# Patient Record
Sex: FEMALE | Race: WHITE | Employment: OTHER | RURAL
[De-identification: names, ages, dates, MRNs, and addresses within clinical notes are randomized per-mention and may not be internally consistent; named-entity substitution may affect disease eponyms.]

---

## 2017-01-12 RX ORDER — AMLODIPINE BESYLATE 5 MG/1
5 TABLET ORAL DAILY
Qty: 90 TAB | Refills: 3 | Status: SHIPPED | OUTPATIENT
Start: 2017-01-12 | End: 2018-01-16 | Stop reason: SDUPTHER

## 2017-01-23 ENCOUNTER — TELEPHONE (OUTPATIENT)
Dept: FAMILY MEDICINE CLINIC | Age: 72
End: 2017-01-23

## 2017-01-23 NOTE — TELEPHONE ENCOUNTER
PT contacted us, having a recurrence of the same kind of rash that she had when she was on pravastatin, started about 2 wk ago. She reported she stopped the lovastatin when the rash started, but the rash didn't improve this time like it did when she was on pravastatin. May be having SE's to norvasc. Try d/c norvasc, and stay off of lovastatin for now, see how BP's do. Has periactin to take PRN itching. Pt will let us know how things are going.

## 2017-01-23 NOTE — TELEPHONE ENCOUNTER
[phone] contact number per patient, please call back as she spoke with Dr. Joshua Franco around 1pm and need him to repeat the conversation and have to ask another question concerning medication that patient and doctor just discussed. The medication lovastatin (MEVACOR) 10 mg tablets? ?  Please call as patient has not taken any today and needs to know if she should continue as she has NOT taken this medication for 2 weeks. Contact number once again is [phone].   Thanks,

## 2017-01-27 ENCOUNTER — TELEPHONE (OUTPATIENT)
Dept: FAMILY MEDICINE CLINIC | Age: 72
End: 2017-01-27

## 2017-01-27 NOTE — TELEPHONE ENCOUNTER
[phone] contact number, please call as she went to Dermatolgist on yesterday(01/26/2017) in [facility], Dr. Rufina Anderson and please call so she can give you the information. Patient states that you instructed her to call you today(Friday) and she will be waiting your call. Please call @ [phone].   Thanks,

## 2017-02-07 ENCOUNTER — TELEPHONE (OUTPATIENT)
Dept: FAMILY MEDICINE CLINIC | Age: 72
End: 2017-02-07

## 2017-02-07 DIAGNOSIS — L30.8 OTHER ECZEMA: Primary | ICD-10-CM

## 2017-02-07 DIAGNOSIS — E78.00 PURE HYPERCHOLESTEROLEMIA: ICD-10-CM

## 2017-02-07 NOTE — TELEPHONE ENCOUNTER
Abigail called. They found out she had eczema. Dr. Nadja Bernardo told her to stop her Simvastatin and started her on Lovastatin. Since the rash did not come from the med, wants to know if she can take the Simvastatin (because she has plenty of pills). Please call.

## 2017-02-08 RX ORDER — SIMVASTATIN 10 MG/1
10 TABLET, FILM COATED ORAL
Qty: 30 TAB | Refills: 11
Start: 2017-02-08 | End: 2017-06-08

## 2017-02-15 ENCOUNTER — TELEPHONE (OUTPATIENT)
Dept: FAMILY MEDICINE CLINIC | Age: 72
End: 2017-02-15

## 2017-02-15 NOTE — TELEPHONE ENCOUNTER
Abigail called about a week ago regarding her meds. No one never called her back. Wants Cosmo Ok to call today please.

## 2017-02-27 DIAGNOSIS — E03.4 HYPOTHYROIDISM DUE TO ACQUIRED ATROPHY OF THYROID: ICD-10-CM

## 2017-02-28 RX ORDER — LEVOTHYROXINE SODIUM 75 UG/1
TABLET ORAL
Qty: 90 TAB | Refills: 3 | Status: SHIPPED | OUTPATIENT
Start: 2017-02-28 | End: 2018-01-16 | Stop reason: SDUPTHER

## 2017-03-03 DIAGNOSIS — K21.9 GASTROESOPHAGEAL REFLUX DISEASE WITHOUT ESOPHAGITIS: ICD-10-CM

## 2017-03-03 DIAGNOSIS — R10.13 EPIGASTRIC PAIN: ICD-10-CM

## 2017-03-03 RX ORDER — OMEPRAZOLE 40 MG/1
CAPSULE, DELAYED RELEASE ORAL
Qty: 90 CAP | Refills: 3 | Status: SHIPPED | OUTPATIENT
Start: 2017-03-03 | End: 2018-01-16 | Stop reason: SDUPTHER

## 2017-03-09 ENCOUNTER — OFFICE VISIT (OUTPATIENT)
Dept: FAMILY MEDICINE CLINIC | Age: 72
End: 2017-03-09

## 2017-03-09 VITALS
DIASTOLIC BLOOD PRESSURE: 63 MMHG | OXYGEN SATURATION: 99 % | BODY MASS INDEX: 31.55 KG/M2 | SYSTOLIC BLOOD PRESSURE: 126 MMHG | RESPIRATION RATE: 16 BRPM | HEIGHT: 67 IN | WEIGHT: 201 LBS | TEMPERATURE: 97.9 F | HEART RATE: 74 BPM

## 2017-03-09 DIAGNOSIS — I25.10 CORONARY ARTERY DISEASE INVOLVING NATIVE CORONARY ARTERY OF NATIVE HEART WITHOUT ANGINA PECTORIS: ICD-10-CM

## 2017-03-09 DIAGNOSIS — J30.89 SEASONAL ALLERGIC RHINITIS DUE TO OTHER ALLERGIC TRIGGER: Primary | ICD-10-CM

## 2017-03-09 DIAGNOSIS — J01.10 ACUTE NON-RECURRENT FRONTAL SINUSITIS: ICD-10-CM

## 2017-03-09 DIAGNOSIS — E78.00 PURE HYPERCHOLESTEROLEMIA: ICD-10-CM

## 2017-03-09 RX ORDER — DEXAMETHASONE 4 MG/1
TABLET ORAL
Qty: 4 TAB | Refills: 0 | Status: SHIPPED | OUTPATIENT
Start: 2017-03-09 | End: 2017-06-08

## 2017-03-09 RX ORDER — PRAVASTATIN SODIUM 40 MG/1
40 TABLET ORAL
COMMUNITY
End: 2017-06-08 | Stop reason: SDUPTHER

## 2017-03-09 RX ORDER — AMOXICILLIN 500 MG/1
500 CAPSULE ORAL 3 TIMES DAILY
Qty: 30 CAP | Refills: 0 | Status: SHIPPED | OUTPATIENT
Start: 2017-03-09 | End: 2017-03-19

## 2017-03-09 RX ORDER — BETAMETHASONE DIPROPIONATE 0.5 MG/G
OINTMENT TOPICAL
COMMUNITY
Start: 2017-01-26 | End: 2017-06-08

## 2017-03-09 RX ORDER — TRIAMCINOLONE ACETONIDE 1 MG/G
CREAM TOPICAL
COMMUNITY
Start: 2017-01-26 | End: 2017-06-08

## 2017-03-09 NOTE — PATIENT INSTRUCTIONS
Allergies: Care Instructions  Take the dexamethasone today, take for 4 days, and get some afrin OTC, take for 3 days only. Use the amoxicillin if not improving or worsening. Your Care Instructions  Allergies occur when your body's defense system (immune system) overreacts to certain substances. The immune system treats a harmless substance as if it were a harmful germ or virus. Many things can cause this overreaction, including pollens, medicine, food, dust, animal dander, and mold. Allergies can be mild or severe. Mild allergies can be managed with home treatment. But medicine may be needed to prevent problems. Managing your allergies is an important part of staying healthy. Your doctor may suggest that you have allergy testing to help find out what is causing your allergies. When you know what things trigger your symptoms, you can avoid them. This can prevent allergy symptoms and other health problems. For severe allergies that cause reactions that affect your whole body (anaphylactic reactions), your doctor may prescribe a shot of epinephrine to carry with you in case you have a severe reaction. Learn how to give yourself the shot and keep it with you at all times. Make sure it is not . Follow-up care is a key part of your treatment and safety. Be sure to make and go to all appointments, and call your doctor if you are having problems. It's also a good idea to know your test results and keep a list of the medicines you take. How can you care for yourself at home? · If you have been told by your doctor that dust or dust mites are causing your allergy, decrease the dust around your bed:  [facility] sheets, pillowcases, and other bedding in hot water every week. ¨ Use dust-proof covers for pillows, duvets, and mattresses. Avoid plastic covers because they tear easily and do not \"breathe. \" Wash as instructed on the label. ¨ Do not use any blankets and pillows that you do not need.   ¨ Use blankets that you can wash in your washing machine. ¨ Consider removing drapes and carpets, which attract and hold dust, from your bedroom. · If you are allergic to house dust and mites, do not use home humidifiers. Your doctor can suggest ways you can control dust and mites. · Look for signs of cockroaches. Cockroaches cause allergic reactions. Use cockroach baits to get rid of them. Then, clean your home well. Cockroaches like areas where grocery bags, newspapers, empty bottles, or cardboard boxes are stored. Do not keep these inside your home, and keep trash and food containers sealed. Seal off any spots where cockroaches might enter your home. · If you are allergic to mold, get rid of furniture, rugs, and drapes that smell musty. Check for mold in the bathroom. · If you are allergic to outdoor pollen or mold spores, use air-conditioning. Change or clean all filters every month. Keep windows closed. · If you are allergic to pollen, stay inside when pollen counts are high. Use a vacuum  with a HEPA filter or a double-thickness filter at least two times each week. · Stay inside when air pollution is bad. Avoid paint fumes, perfumes, and other strong odors. · Avoid conditions that make your allergies worse. Stay away from smoke. Do not smoke or let anyone else smoke in your house. Do not use fireplaces or wood-burning stoves. · If you are allergic to your pets, change the air filter in your furnace every month. Use high-efficiency filters. · If you are allergic to pet dander, keep pets outside or out of your bedroom. Old carpet and cloth furniture can hold a lot of animal dander. You may need to replace them. When should you call for help? Give an epinephrine shot if:  · You think you are having a severe allergic reaction. · You have symptoms in more than one body area, such as mild nausea and an itchy mouth. After giving an epinephrine shot call 911, even if you feel better.   Call 911 if:  · You have symptoms of a severe allergic reaction. These may include:  ¨ Sudden raised, red areas (hives) all over your body. ¨ Swelling of the throat, mouth, lips, or tongue. ¨ Trouble breathing. ¨ Passing out (losing consciousness). Or you may feel very lightheaded or suddenly feel weak, confused, or restless. · You have been given an epinephrine shot, even if you feel better. Call your doctor now or seek immediate medical care if:  · You have symptoms of an allergic reaction, such as:  ¨ A rash or hives (raised, red areas on the skin). ¨ Itching. ¨ Swelling. ¨ Belly pain, nausea, or vomiting. Watch closely for changes in your health, and be sure to contact your doctor if:  · You do not get better as expected. Where can you learn more? Go to http://dez-marquez.info/. Enter V523 in the search box to learn more about \"Allergies: Care Instructions. \"  Current as of: February 12, 2016  Content Version: 11.1  © 2599-8489 [employer]. Care instructions adapted under license by [employer] (which disclaims liability or warranty for this information). If you have questions about a medical condition or this instruction, always ask your healthcare professional. [address] any warranty or liability for your use of this information.

## 2017-03-09 NOTE — MR AVS SNAPSHOT
Visit Information Date & Time Provider Department Dept. Phone Encounter #  
 3/9/2017  9:30 AM Idelle Felty, MD [address] [place] [address] [id number] Follow-up Instructions Return in about 3 months (around 6/9/2017). Follow-up and Disposition History Upcoming Health Maintenance Date Due Hepatitis C Screening 1945 DTaP/Tdap/Td series (1 - Tdap) 1/31/1968 FOBT Q 1 YEAR AGE 50-75 1/31/1997 GLAUCOMA SCREENING Q2Y 1/31/2012 Pneumococcal 65+ Low/Medium Risk (2 of 2 - PCV13) 3/4/2015 MEDICARE YEARLY EXAM 5/19/2017 BREAST CANCER SCRN MAMMOGRAM 7/13/2017 Allergies as of 3/9/2017  Review Complete On: 3/9/2017 By: Idelle Felty, MD  
  
 Severity Noted Reaction Type Reactions Nitrate Analogues  03/03/2014   Intolerance Other (comments) Severe headache Bactrim [Sulfamethoprim] Low 12/12/2016   Topical Rash Current Immunizations  Reviewed on 9/28/2016 Name Date Influenza High Dose Vaccine PF 9/28/2016 Influenza Vaccine 10/14/2015, 10/4/2013 Pneumococcal Polysaccharide (PPSV-23) 3/4/2014 10:40 AM  
  
 Not reviewed this visit You Were Diagnosed With   
  
 Codes Comments Seasonal allergic rhinitis due to other allergic trigger    -  Primary ICD-10-CM: J30.89 Acute non-recurrent frontal sinusitis     ICD-10-CM: J01.10 ICD-9-CM: 522.5 Coronary artery disease involving native coronary artery of native heart without angina pectoris     ICD-10-CM: I25.10 ICD-9-CM: 414.01   
 Pure hypercholesterolemia     ICD-10-CM: E78.00 ICD-9-CM: 272.0 Vitals BP Pulse Temp Resp Height(growth percentile) Weight(growth percentile) 126/63 (BP 1 Location: Right arm, BP Patient Position: Sitting) 74 97.9 °F (36.6 °C) (Oral) 16 5' 7\" (1.702 m) 201 lb (91.2 kg) SpO2 BMI OB Status Smoking Status 99% 31.48 kg/m2 Menopause Never Smoker BMI and BSA Data Body Mass Index Body Surface Area 31.48 kg/m 2 2.08 m 2 Preferred Pharmacy Pharmacy Name Phone [facility] [address] [place] - [address] [phone] Your Updated Medication List  
  
   
This list is accurate as of: 3/9/17 10:59 AM.  Always use your most recent med list. amLODIPine 5 mg tablet Commonly known as:  Philip Haven Take 1 Tab by mouth daily. amoxicillin 500 mg capsule Commonly known as:  AMOXIL Take 1 Cap by mouth three (3) times daily for 10 days. Indications: worsening sinus infection  
  
 aspirin 81 mg chewable tablet Take 1 Tab by mouth daily. Over the counter  
  
 augmented betamethasone dipropionate 0.05 % ointment Commonly known as:  DIPROLENE-AF  
  
 carvedilol 25 mg tablet Commonly known as:  Chito Peat Take 1 Tab by mouth two (2) times daily (with meals). Indications: pressure and heart  
  
 cholecalciferol (VITAMIN D3) 5,000 unit Tab tablet Commonly known as:  VITAMIN D3 Take 1 Tab by mouth daily. cyproheptadine 4 mg tablet Commonly known as:  PERIACTIN Take 1 Tab by mouth three (3) times daily as needed. Indications: itching  
  
 dexamethasone 4 mg tablet Commonly known as:  DECADRON  
1 po every day for 4 days for sinus  
  
 irbesartan-hydroCHLOROthiazide 300-12.5 mg per tablet Commonly known as:  AVALIDE  
TAKE 1 TABLET EVERY DAY  FOR  PRESSURE  
  
 levothyroxine 75 mcg tablet Commonly known as:  SYNTHROID  
TAKE 1 TABLET EVERY DAY BEFORE BREAKFAST  FOR  LOW  THYROID  
  
 mometasone 0.1 % topical cream  
Commonly known as:  ELOCON  
AAA every day as needed for itchy rash  
  
 nitroglycerin 0.4 mg SL tablet Commonly known as:  NITROSTAT  
1 Tab by SubLINGual route every five (5) minutes as needed for Chest Pain (call 911 if not relieved by 3). omeprazole 40 mg capsule Commonly known as:  PRILOSEC  
TAKE 1 CAPSULE EVERY DAY  
  
 pravastatin 40 mg tablet Commonly known as:  PRAVACHOL  
 Take 40 mg by mouth nightly. simvastatin 10 mg tablet Commonly known as:  ZOCOR Take 1 Tab by mouth nightly. Indications: heart and cholesterol  
  
 triamcinolone acetonide 0.1 % topical cream  
Commonly known as:  KENALOG Prescriptions Sent to Pharmacy Refills  
 dexamethasone (DECADRON) 4 mg tablet 0 Si po every day for 4 days for sinus Class: Normal  
 Pharmacy: [address] [facility] [address] [facility] - [address] Ph #: [phone]  
 amoxicillin (AMOXIL) 500 mg capsule 0 Sig: Take 1 Cap by mouth three (3) times daily for 10 days. Indications: worsening sinus infection Class: Normal  
 Pharmacy: [id number] Ph #: [phone] Route: Oral  
  
Follow-up Instructions Return in about 3 months (around 2017). Patient Instructions Allergies: Care Instructions Take the dexamethasone today, take for 4 days, and get some afrin OTC, take for 3 days only. Use the amoxicillin if not improving or worsening. Your Care Instructions Allergies occur when your body's defense system (immune system) overreacts to certain substances. The immune system treats a harmless substance as if it were a harmful germ or virus. Many things can cause this overreaction, including pollens, medicine, food, dust, animal dander, and mold. Allergies can be mild or severe. Mild allergies can be managed with home treatment. But medicine may be needed to prevent problems. Managing your allergies is an important part of staying healthy. Your doctor may suggest that you have allergy testing to help find out what is causing your allergies. When you know what things trigger your symptoms, you can avoid them. This can prevent allergy symptoms and other health problems.  
For severe allergies that cause reactions that affect your whole body (anaphylactic reactions), your doctor may prescribe a shot of epinephrine to carry with you in case you have a severe reaction. Learn how to give yourself the shot and keep it with you at all times. Make sure it is not . Follow-up care is a key part of your treatment and safety. Be sure to make and go to all appointments, and call your doctor if you are having problems. It's also a good idea to know your test results and keep a list of the medicines you take. How can you care for yourself at home? · If you have been told by your doctor that dust or dust mites are causing your allergy, decrease the dust around your bed: 
[facility] [employer] sheets, pillowcases, and other bedding in hot water every week. ¨ Use dust-proof covers for pillows, duvets, and mattresses. Avoid plastic covers because they tear easily and do not \"breathe. \" Wash as instructed on the label. ¨ Do not use any blankets and pillows that you do not need. ¨ Use blankets that you can wash in your washing machine. ¨ Consider removing drapes and carpets, which attract and hold dust, from your bedroom. · If you are allergic to house dust and mites, do not use home humidifiers. Your doctor can suggest ways you can control dust and mites. · Look for signs of cockroaches. Cockroaches cause allergic reactions. Use cockroach baits to get rid of them. Then, clean your home well. Cockroaches like areas where grocery bags, newspapers, empty bottles, or cardboard boxes are stored. Do not keep these inside your home, and keep trash and food containers sealed. Seal off any spots where cockroaches might enter your home. · If you are allergic to mold, get rid of furniture, rugs, and drapes that smell musty. Check for mold in the bathroom. · If you are allergic to outdoor pollen or mold spores, use air-conditioning. Change or clean all filters every month. Keep windows closed. · If you are allergic to pollen, stay inside when pollen counts are high.  Use a vacuum  with a HEPA filter or a double-thickness filter at least two times each week. · Stay inside when air pollution is bad. Avoid paint fumes, perfumes, and other strong odors. · Avoid conditions that make your allergies worse. Stay away from smoke. Do not smoke or let anyone else smoke in your house. Do not use fireplaces or wood-burning stoves. · If you are allergic to your pets, change the air filter in your furnace every month. Use high-efficiency filters. · If you are allergic to pet dander, keep pets outside or out of your bedroom. Old carpet and cloth furniture can hold a lot of animal dander. You may need to replace them. When should you call for help? Give an epinephrine shot if: 
· You think you are having a severe allergic reaction. · You have symptoms in more than one body area, such as mild nausea and an itchy mouth. After giving an epinephrine shot call 911, even if you feel better. Call 911 if: 
· You have symptoms of a severe allergic reaction. These may include: 
¨ Sudden raised, red areas (hives) all over your body. ¨ Swelling of the throat, mouth, lips, or tongue. ¨ Trouble breathing. ¨ Passing out (losing consciousness). Or you may feel very lightheaded or suddenly feel weak, confused, or restless. · You have been given an epinephrine shot, even if you feel better. Call your doctor now or seek immediate medical care if: 
· You have symptoms of an allergic reaction, such as: ¨ A rash or hives (raised, red areas on the skin). ¨ Itching. ¨ Swelling. ¨ Belly pain, nausea, or vomiting. Watch closely for changes in your health, and be sure to contact your doctor if: 
· You do not get better as expected. Where can you learn more? Go to http://dez-marquez.info/. Enter F365 in the search box to learn more about \"Allergies: Care Instructions. \" Current as of: February 12, 2016 Content Version: 11.1 © 9783-1660 [employer].  Care instructions adapted under license by [address] (which disclaims liability or warranty for this information). If you have questions about a medical condition or this instruction, always ask your healthcare professional. [address] any warranty or liability for your use of this information. Patient Instructions History Introducing [facility]! Dear Beryle Gelineau: Thank you for requesting a [employer] account. Our records indicate that you already have an active [employer] account. You can access your account anytime at [email or web address] Did you know that you can access your hospital and ER discharge instructions at any time in [employer]? You can also review all of your test results from your hospital stay or ER visit. Additional Information If you have questions, please visit the Frequently Asked Questions section of the [employer] website at [email or web address] Remember, [employer] is NOT to be used for urgent needs. For medical emergencies, dial 911. Now available from your iPhone and Android! Please provide this summary of care documentation to your next provider. Your primary care clinician is listed as Twyla Merino. If you have any questions after today's visit, please call [phone].

## 2017-03-09 NOTE — PROGRESS NOTES
Julio Shafer is a 81 y.o. female presenting for/with:    Headache and Fatigue    HPI:  Symptoms include pressure under eyes, feeling lightheaded, headache, rhinorrhea and sneezing, without itchy eyes. No f/c/n/v/d/rash. Onset of symptoms was 2 days ago, stable since that time. Evaluation to date: none. Treatment to date: APAP, claritin, no help. Follow- up for hypothyroidism. Current dose of levothyroxine 75mcg. Current symptoms: none. TSH in goal.  Lab Results   Component Value Date/Time    TSH 2.190 05/16/2016 09:00 AM     Hyperlipidemia. Back on pravastatin, taking 40mg daily. alesha well, no further rash. Rash turned out to be bad eczema. Lab Results   Component Value Date/Time    Cholesterol, total 178 09/28/2016 09:41 AM    HDL Cholesterol 46 09/28/2016 09:41 AM    LDL, calculated 109 09/28/2016 09:41 AM    VLDL, calculated 23 09/28/2016 09:41 AM    Triglyceride 116 09/28/2016 09:41 AM    CHOL/HDL Ratio 5.0 03/03/2014 04:54 AM     Lab Results   Component Value Date/Time    ALT (SGPT) 18 09/28/2016 09:41 AM    AST (SGOT) 20 09/28/2016 09:41 AM    Alk. phosphatase 65 09/28/2016 09:41 AM    Bilirubin, direct 0.11 09/28/2016 09:41 AM    Bilirubin, total 0.4 09/28/2016 09:41 AM     PMH, SH, Medications/Allergies: reviewed, on chart.     ROS:  Constitutional: No fever, chills or weight loss  Respiratory: No cough, SOB   CV: No chest pain or Palpitations    Exam:  Visit Vitals    /63 (BP 1 Location: Right arm, BP Patient Position: Sitting)    Pulse 74    Temp 97.9 °F (36.6 °C) (Oral)    Resp 16    Ht 5' 7\" (1.702 m)    Wt 201 lb (91.2 kg)    SpO2 99%    BMI 31.48 kg/m2     Wt Readings from Last 3 Encounters:   03/09/17 201 lb (91.2 kg)   12/12/16 23 lb 6.4 oz (10.6 kg)   12/07/16 199 lb (90.3 kg)     BP Readings from Last 3 Encounters:   03/09/17 126/63   12/12/16 120/82   12/07/16 141/82     Physical Examination: General appearance - alert, well appearing, and in no distress  Mental status - alert, oriented to person, place, and time  Eyes - pupils equal and reactive, extraocular eye movements intact  ENT - External ears normal, Ext nose normal. Normal lips. OP pink, moist.  Neck - supple, no significant adenopathy, no thyromegaly or mass. No erythema to upper neck and chest, no eruption to upper neck, cheeks. A/P:  Allergic rhinitis with sinus congestion  Tx with dexamethasone 4mg every day for 4 days, con't claritin 10mg every day and afrin 2pf BID for 3 days only. If not improving, or worsening/f/c, start tx with amoxil 500mg TID x10d. Eczema  Well controlled now on diprolene. Seeing Dr. Rose Sandoval for that in [facility] off [address]. Off periactin. HTN  Doing well. con't  Coreg, avalide, norvasc, ASA at current doses. Lipids and CHD  Back on pravastatin 40 qd, alesha well. Had myalgias with lipitor in past.    F/U 3mo.

## 2017-06-08 ENCOUNTER — OFFICE VISIT (OUTPATIENT)
Dept: FAMILY MEDICINE CLINIC | Age: 72
End: 2017-06-08

## 2017-06-08 VITALS
RESPIRATION RATE: 18 BRPM | DIASTOLIC BLOOD PRESSURE: 64 MMHG | OXYGEN SATURATION: 100 % | WEIGHT: 203 LBS | HEART RATE: 73 BPM | HEIGHT: 67 IN | BODY MASS INDEX: 31.86 KG/M2 | SYSTOLIC BLOOD PRESSURE: 139 MMHG | TEMPERATURE: 97.8 F

## 2017-06-08 DIAGNOSIS — Z13.39 SCREENING FOR ALCOHOLISM: ICD-10-CM

## 2017-06-08 DIAGNOSIS — Z11.59 NEED FOR HEPATITIS C SCREENING TEST: ICD-10-CM

## 2017-06-08 DIAGNOSIS — I10 ESSENTIAL HYPERTENSION WITH GOAL BLOOD PRESSURE LESS THAN 140/90: ICD-10-CM

## 2017-06-08 DIAGNOSIS — I25.10 CORONARY ARTERY DISEASE INVOLVING NATIVE CORONARY ARTERY OF NATIVE HEART WITHOUT ANGINA PECTORIS: ICD-10-CM

## 2017-06-08 DIAGNOSIS — Z13.31 SCREENING FOR DEPRESSION: ICD-10-CM

## 2017-06-08 DIAGNOSIS — Z23 ENCOUNTER FOR IMMUNIZATION: ICD-10-CM

## 2017-06-08 DIAGNOSIS — E03.9 ACQUIRED HYPOTHYROIDISM: ICD-10-CM

## 2017-06-08 DIAGNOSIS — I10 ESSENTIAL HYPERTENSION: ICD-10-CM

## 2017-06-08 DIAGNOSIS — E78.00 PURE HYPERCHOLESTEROLEMIA: ICD-10-CM

## 2017-06-08 DIAGNOSIS — Z00.00 ROUTINE GENERAL MEDICAL EXAMINATION AT A HEALTH CARE FACILITY: Primary | ICD-10-CM

## 2017-06-08 RX ORDER — CARVEDILOL 25 MG/1
25 TABLET ORAL 2 TIMES DAILY WITH MEALS
Qty: 180 TAB | Refills: 3 | Status: SHIPPED | OUTPATIENT
Start: 2017-06-08 | End: 2018-06-08 | Stop reason: SDUPTHER

## 2017-06-08 RX ORDER — PRAVASTATIN SODIUM 40 MG/1
40 TABLET ORAL
Qty: 90 TAB | Refills: 3 | Status: SHIPPED | OUTPATIENT
Start: 2017-06-08 | End: 2017-06-08 | Stop reason: SDUPTHER

## 2017-06-08 RX ORDER — CARVEDILOL 25 MG/1
25 TABLET ORAL 2 TIMES DAILY WITH MEALS
Qty: 180 TAB | Refills: 3 | Status: SHIPPED | OUTPATIENT
Start: 2017-06-08 | End: 2017-06-08 | Stop reason: SDUPTHER

## 2017-06-08 RX ORDER — PRAVASTATIN SODIUM 40 MG/1
40 TABLET ORAL
Qty: 90 TAB | Refills: 3 | Status: SHIPPED | OUTPATIENT
Start: 2017-06-08 | End: 2018-06-08 | Stop reason: SDUPTHER

## 2017-06-08 NOTE — PATIENT INSTRUCTIONS
Pneumococcal 13-Valent Vaccine, Diphtheria Conjugate (By injection)   Pneumococcal 13-Valent Vaccine, Diphtheria Conjugate (EBF-erg-MQY-al 13-VAY-lent VAX-een, dif-THEER-ee-a QGR-ida-pqau)  Prevents infections, such as pneumonia and meningitis. Brand Name(s): Prevnar 13   There may be other brand names for this medicine. When This Medicine Should Not Be Used: This vaccine is not right for everyone. You should not receive it if you had an allergic reaction to pneumococcal or diphtheria vaccine. How to Use This Medicine:   Injectable  · A nurse or other health provider will give you this medicine. This vaccine is usually given as a shot into a muscle in the thigh or upper arm. · The vaccine schedule is different for different people. ¨ Tell your doctor if your child was born prematurely. Children who were premature may need to follow a different schedule. ¨ Children younger than 10years of age: This vaccine is usually given as 3 or 4 separate shots over several months. Your child's doctor will tell you how many shots are needed and when to come back for the next one. ¨ Children older than 10years of age: This vaccine is given as a single shot. If your child recently received another pneumonia vaccine, this one should be given at least 8 weeks later. ¨ Adults older than 25years of age: This vaccine is given as a single dose. · It is very important for your child to receive all of the shots for the vaccine. · Missed dose: This vaccine must be given on a fixed schedule. If your child misses a dose, call your child's doctor for another appointment. Drugs and Foods to Avoid:   Ask your doctor or pharmacist before using any other medicine, including over-the-counter medicines, vitamins, and herbal products. · Some foods and medicines can affect how this vaccine works. Tell your doctor if you are receiving a treatment or medicine that causes a weak immune system.  This includes radiation treatment, steroid medicine (including hydrocortisone, methylprednisolone, prednisolone, prednisone), or cancer medicine. Warnings While Using This Medicine:   · Tell your doctor if you are pregnant or breastfeeding. · Tell your doctor if you have a weak immune system. You may not be fully protected by this vaccine. Possible Side Effects While Using This Medicine:   Call your doctor right away if you notice any of these side effects:  · Allergic reaction: Itching or hives, swelling in your face or hands, swelling or tingling in your mouth or throat, chest tightness, trouble breathing  · High fever  If you notice these less serious side effects, talk with your doctor:   · Crying, irritability, or fussiness  · Joint or muscle pain  · Mild skin rash  · Pain, burning, redness, or swelling where the shot was given  · Poor appetite  · Sleep changes  If you notice other side effects that you think are caused by this medicine, tell your doctor. Call your doctor for medical advice about side effects. You may report side effects to FDA at 4-782-FDA-0634  © 2017 [facility] Information is for End User's use only and may not be sold, redistributed or otherwise used for commercial purposes. The above information is an  only. It is not intended as medical advice for individual conditions or treatments. Talk to your doctor, nurse or pharmacist before following any medical regimen to see if it is safe and effective for you.

## 2017-06-08 NOTE — MR AVS SNAPSHOT
Visit Information Date & Time Provider Department Dept. Phone Encounter #  
 6/8/2017  9:30 AM Pedro Mitchell MD Miriam Reis Be [id number] Follow-up Instructions Return in about 6 months (around 12/8/2017). Upcoming Health Maintenance Date Due Hepatitis C Screening 1945 COLONOSCOPY 1/31/1965 GLAUCOMA SCREENING Q2Y 1/31/2012 Pneumococcal 65+ Low/Medium Risk (2 of 2 - PCV13) 3/4/2015 MEDICARE YEARLY EXAM 5/19/2017 BREAST CANCER SCRN MAMMOGRAM 7/13/2017 INFLUENZA AGE 9 TO ADULT 8/1/2017 DTaP/Tdap/Td series (2 - Td) 6/8/2027 Allergies as of 6/8/2017  Review Complete On: 6/8/2017 By: Pedro Mitchell MD  
  
 Severity Noted Reaction Type Reactions Nitrate Analogues  03/03/2014   Intolerance Other (comments) Severe headache Bactrim [Sulfamethoprim] Low 12/12/2016   Topical Rash Current Immunizations  Reviewed on 9/28/2016 Name Date Influenza High Dose Vaccine PF 9/28/2016 Influenza Vaccine 10/14/2015, 10/4/2013 Pneumococcal Conjugate (PCV-13)  Incomplete Pneumococcal Polysaccharide (PPSV-23) 3/4/2014 10:40 AM  
  
 Not reviewed this visit You Were Diagnosed With   
  
 Codes Comments Routine general medical examination at a health care facility    -  Primary ICD-10-CM: Z00.00 ICD-9-CM: V70.0 Coronary artery disease involving native coronary artery of native heart without angina pectoris     ICD-10-CM: I25.10 ICD-9-CM: 414.01 Essential hypertension with goal blood pressure less than 140/90     ICD-10-CM: I10 
ICD-9-CM: 401.9 Essential hypertension     ICD-10-CM: I10 
ICD-9-CM: 401.9 Pure hypercholesterolemia     ICD-10-CM: E78.00 ICD-9-CM: 272.0 Acquired hypothyroidism     ICD-10-CM: E03.9 ICD-9-CM: 244.9 Screening for alcoholism     ICD-10-CM: Z13.89 ICD-9-CM: V79.1 Screening for depression     ICD-10-CM: Z13.89 ICD-9-CM: V79.0 Need for hepatitis C screening test     ICD-10-CM: Z11.59 
ICD-9-CM: V73.89 Encounter for immunization     ICD-10-CM: R32 ICD-9-CM: V03.89 Vitals BP Pulse Temp Resp Height(growth percentile) Weight(growth percentile) 139/64 73 97.8 °F (36.6 °C) (Oral) 18 5' 7\" (1.702 m) 203 lb (92.1 kg) SpO2 BMI OB Status Smoking Status 100% 31.79 kg/m2 Menopause Never Smoker BMI and BSA Data Body Mass Index Body Surface Area 31.79 kg/m 2 2.09 m 2 Preferred Pharmacy Pharmacy Name Phone [facility] [address] [phone] Your Updated Medication List  
  
   
This list is accurate as of: 6/8/17 10:19 AM.  Always use your most recent med list. amLODIPine 5 mg tablet Commonly known as:  Umu Wallace Take 1 Tab by mouth daily. aspirin 81 mg chewable tablet Take 1 Tab by mouth daily. Over the counter  
  
 carvedilol 25 mg tablet Commonly known as:  Cleda Nicolas Take 1 Tab by mouth two (2) times daily (with meals). Indications: pressure and heart  
  
 cholecalciferol (VITAMIN D3) 5,000 unit Tab tablet Commonly known as:  VITAMIN D3 Take 1 Tab by mouth daily. irbesartan-hydroCHLOROthiazide 300-12.5 mg per tablet Commonly known as:  AVALIDE  
TAKE 1 TABLET EVERY DAY  FOR  PRESSURE  
  
 levothyroxine 75 mcg tablet Commonly known as:  SYNTHROID  
TAKE 1 TABLET EVERY DAY BEFORE BREAKFAST  FOR  LOW  THYROID  
  
 nitroglycerin 0.4 mg SL tablet Commonly known as:  NITROSTAT  
1 Tab by SubLINGual route every five (5) minutes as needed for Chest Pain (call 911 if not relieved by 3). omeprazole 40 mg capsule Commonly known as:  PRILOSEC  
TAKE 1 CAPSULE EVERY DAY  
  
 pravastatin 40 mg tablet Commonly known as:  PRAVACHOL Take 1 Tab by mouth nightly. Indications: heart and cholesterol Prescriptions Sent to Pharmacy  Refills  
 pravastatin (PRAVACHOL) 40 mg tablet 3  
 Sig: Take 1 Tab by mouth nightly. Indications: heart and cholesterol Class: Normal  
 Pharmacy: [facility] Ph #: [phone] Route: Oral  
 carvedilol (COREG) 25 mg tablet 3 Sig: Take 1 Tab by mouth two (2) times daily (with meals). Indications: pressure and heart Class: Normal  
 Pharmacy: [facility] [address] Ph #: [phone] Route: Oral  
  
We Performed the Following ADMIN PNEUMOCOCCAL VACCINE [ HCPCS] [address] [[id number] HCPCS] HEPATITIS C AB [94736 CPT(R)] LIPID PANEL [54739 CPT(R)] METABOLIC PANEL, COMPREHENSIVE [37268 CPT(R)] PNEUMOCOCCAL CONJ VACCINE 13 VALENT IM [id number] CPT(R)] TSH RFX ON ABNORMAL TO FREE T4 [[id number] Custom] Follow-up Instructions Return in about 6 months (around 12/8/2017). Patient Instructions Pneumococcal 13-Valent Vaccine, Diphtheria Conjugate (By injection) Pneumococcal 13-Valent Vaccine, Diphtheria Conjugate (DKB-myy-BZT-al 13-VAY-lent VAX-een, dif-THEER-ee-a SUL-fwh-pgeb) Prevents infections, such as pneumonia and meningitis. Brand Name(s): Prevnar 13 There may be other brand names for this medicine. When This Medicine Should Not Be Used: This vaccine is not right for everyone. You should not receive it if you had an allergic reaction to pneumococcal or diphtheria vaccine. How to Use This Medicine:  
Injectable · A nurse or other health provider will give you this medicine. This vaccine is usually given as a shot into a muscle in the thigh or upper arm. · The vaccine schedule is different for different people. ¨ Tell your doctor if your child was born prematurely. Children who were premature may need to follow a different schedule. ¨ Children younger than 10years of age: This vaccine is usually given as 3 or 4 separate shots over several months.  Your child's doctor will tell you how many shots are needed and when to come back for the next one. ¨ Children older than 10years of age: This vaccine is given as a single shot. If your child recently received another pneumonia vaccine, this one should be given at least 8 weeks later. ¨ Adults older than 25years of age: This vaccine is given as a single dose. · It is very important for your child to receive all of the shots for the vaccine. · Missed dose: This vaccine must be given on a fixed schedule. If your child misses a dose, call your child's doctor for another appointment. Drugs and Foods to Avoid: Ask your doctor or pharmacist before using any other medicine, including over-the-counter medicines, vitamins, and herbal products. · Some foods and medicines can affect how this vaccine works. Tell your doctor if you are receiving a treatment or medicine that causes a weak immune system. This includes radiation treatment, steroid medicine (including hydrocortisone, methylprednisolone, prednisolone, prednisone), or cancer medicine. Warnings While Using This Medicine: · Tell your doctor if you are pregnant or breastfeeding. · Tell your doctor if you have a weak immune system. You may not be fully protected by this vaccine. Possible Side Effects While Using This Medicine:  
Call your doctor right away if you notice any of these side effects: · Allergic reaction: Itching or hives, swelling in your face or hands, swelling or tingling in your mouth or throat, chest tightness, trouble breathing · High fever If you notice these less serious side effects, talk with your doctor: · Crying, irritability, or fussiness · Joint or muscle pain · Mild skin rash · Pain, burning, redness, or swelling where the shot was given · Poor appetite · Sleep changes If you notice other side effects that you think are caused by this medicine, tell your doctor. Call your doctor for medical advice about side effects.  You may report side effects to FDA at 1-514-FDA-8804 © 2017 [address] Information is for End User's use only and may not be sold, redistributed or otherwise used for commercial purposes. The above information is an  only. It is not intended as medical advice for individual conditions or treatments. Talk to your doctor, nurse or pharmacist before following any medical regimen to see if it is safe and effective for you. Introducing \[facility] [place] [facility]\"" & HEALTH SERVICES! Dear Carter Vides: Thank you for requesting a [employer] account. Our records indicate that you already have an active [employer] account. You can access your account anytime at [email or web address] Did you know that you can access your hospital and ER discharge instructions at any time in [employer]? You can also review all of your test results from your hospital stay or ER visit. Additional Information If you have questions, please visit the Frequently Asked Questions section of the [employer] website at [email or web address] Remember, [employer] is NOT to be used for urgent needs. For medical emergencies, dial 911. Now available from your iPhone and Android! Please provide this summary of care documentation to your next provider. Your primary care clinician is listed as Flores Merino. If you have any questions after today's visit, please call [phone].

## 2017-06-08 NOTE — PROGRESS NOTES
Annika Gibbs is a 81 y.o. female and presents for annual Medicare Wellness Visit. Problem List: Reviewed with patient and discussed risk factors. Patient Active Problem List   Diagnosis Code    Eczema L30.9    Acne rosacea L71.9    S/P cholecystectomy Z90.49    IBS (irritable bowel syndrome) K58.9    Migraine G43.909    Chronic pancreatitis (HCC) K86.1    HTN (hypertension) I10    Unstable angina pectoris (HCC) I20.0    Hypothyroidism E03.9    GERD (gastroesophageal reflux disease) K21.9    Anxiety F41.9    Family history of ischemic heart disease Z80.55    CAD (coronary artery disease), native coronary artery I25.10    S/P cardiac cath Z98.890    Hyperlipemia E78.5    Advance directive discussed with patient Z70.80       Current medical providers:  Patient Care Team:  Chris Prado MD as PCP - General (Family Practice)    PSH: Reviewed with patient  Past Surgical History:   Procedure Laterality Date    ABDOMEN SURGERY PROC UNLISTED      HX APPENDECTOMY      HX CHOLECYSTECTOMY          SH: Reviewed with patient  Social History   Substance Use Topics    Smoking status: Never Smoker    Smokeless tobacco: Never Used    Alcohol use No       FH: Reviewed with patient  Family History   Problem Relation Age of Onset    Heart Disease Mother     Heart Disease Father        Medications/Allergies: Reviewed with patient  Current Outpatient Prescriptions on File Prior to Visit   Medication Sig Dispense Refill    omeprazole (PRILOSEC) 40 mg capsule TAKE 1 CAPSULE EVERY DAY 90 Cap 3    levothyroxine (SYNTHROID) 75 mcg tablet TAKE 1 TABLET EVERY DAY BEFORE BREAKFAST  FOR  LOW  THYROID 90 Tab 3    amLODIPine (NORVASC) 5 mg tablet Take 1 Tab by mouth daily. 90 Tab 3    irbesartan-hydroCHLOROthiazide (AVALIDE) 300-12.5 mg per tablet TAKE 1 TABLET EVERY DAY  FOR  PRESSURE 90 Tab 3    aspirin 81 mg chewable tablet Take 1 Tab by mouth daily.  Over the counter 90 Tab 999    nitroglycerin (NITROSTAT) 0.4 mg SL tablet 1 Tab by SubLINGual route every five (5) minutes as needed for Chest Pain (call 911 if not relieved by 3). 25 Tab 1    cholecalciferol, VITAMIN D3, (VITAMIN D3) 5,000 unit tab tablet Take 1 Tab by mouth daily. (Patient taking differently: Take 5,000 Units by mouth two (2) days a week.) 90 Tab 3     No current facility-administered medications on file prior to visit. Allergies   Allergen Reactions    Nitrate Analogues Other (comments)     Severe headache    Bactrim [Sulfamethoprim] Rash       Objective:  Visit Vitals    /64    Pulse 73    Temp 97.8 °F (36.6 °C) (Oral)    Resp 18    Ht 5' 7\" (1.702 m)    Wt 203 lb (92.1 kg)    SpO2 100%    BMI 31.79 kg/m2    Body mass index is 31.79 kg/(m^2). Assessment of cognitive impairment: Alert and oriented x 3    Depression Screen:   PHQ over the last two weeks 6/8/2017   PHQ Not Done -   Little interest or pleasure in doing things Not at all   Feeling down, depressed or hopeless Not at all   Total Score PHQ 2 0       Fall Risk Assessment:    Fall Risk Assessment, last 12 mths 6/8/2017   Able to walk? Yes   Fall in past 12 months? No       Functional Ability:   Does the patient exhibit a steady gait? yes   How long did it take the patient to get up and walk from a sitting position? 1   Is the patient self reliant?  (ie can do own laundry, meals, household chores)  yes     Does the patient handle his/her own medications? yes     Does the patient handle his/her own money? yes     Is the patients home safe (ie good lighting, handrails on stairs and bath, etc.)? yes     Did you notice or did patient express any hearing difficulties? no     Did you notice or did patient express any vision difficulties?   no     Were distance and reading eye charts used?   no       Advance Care Planning:   Patient was offered the opportunity to discuss advance care planning:  yes     Does patient have an Advance Directive:  yes   If no, did you provide information on [facility] Connections? yes       Plan:    Colon check due UTD, had with Ad Carter ~2010. Has fhx colon ca in brother age 77 at dx, so still gets q10y interval. Get report. Go from there. HTN  well controlled. con't current tx. HLD  well controlled. con't current tx. Check labs. Hypothryoid  Check TSH    Eczema  Doing well lately. Monitor. Orders Placed This Encounter    Depression Screen Annual    Pneumococcal Conjugate Vaccine    LIPID PANEL    METABOLIC PANEL, COMPREHENSIVE    TSH RFX ON ABNORMAL TO FREE T4    HEPATITIS C AB    Pneumococcal Admin ()    pravastatin (PRAVACHOL) 40 mg tablet    carvedilol (COREG) 25 mg tablet       Health Maintenance   Topic Date Due    Hepatitis C Screening  1945    COLONOSCOPY  01/31/1965    GLAUCOMA SCREENING Q2Y  01/31/2012    Pneumococcal 65+ Low/Medium Risk (2 of 2 - PCV13) 03/04/2015    MEDICARE YEARLY EXAM  05/19/2017    BREAST CANCER SCRN MAMMOGRAM  07/13/2017    INFLUENZA AGE 9 TO ADULT  08/01/2017    DTaP/Tdap/Td series (2 - Td) 06/08/2027    OSTEOPOROSIS SCREENING (DEXA)  Completed    ZOSTER VACCINE AGE 60>  Completed       *Patient verbalized understanding and agreement with the plan. A copy of the After Visit Summary with personalized health plan was given to the patient today.

## 2017-06-09 LAB
ALBUMIN SERPL-MCNC: 4.4 G/DL (ref 3.5–4.8)
ALBUMIN/GLOB SERPL: 1.8 {RATIO} (ref 1.2–2.2)
ALP SERPL-CCNC: 70 IU/L (ref 39–117)
ALT SERPL-CCNC: 13 IU/L (ref 0–32)
AST SERPL-CCNC: 16 IU/L (ref 0–40)
BILIRUB SERPL-MCNC: 0.3 MG/DL (ref 0–1.2)
BUN SERPL-MCNC: 21 MG/DL (ref 8–27)
BUN/CREAT SERPL: 18 (ref 12–28)
CALCIUM SERPL-MCNC: 9.3 MG/DL (ref 8.7–10.3)
CHLORIDE SERPL-SCNC: 101 MMOL/L (ref 96–106)
CHOLEST SERPL-MCNC: 181 MG/DL (ref 100–199)
CO2 SERPL-SCNC: 24 MMOL/L (ref 18–29)
CREAT SERPL-MCNC: 1.2 MG/DL (ref 0.57–1)
GLOBULIN SER CALC-MCNC: 2.4 G/DL (ref 1.5–4.5)
GLUCOSE SERPL-MCNC: 109 MG/DL (ref 65–99)
HCV AB S/CO SERPL IA: <0.1 S/CO RATIO (ref 0–0.9)
HDLC SERPL-MCNC: 54 MG/DL
LDLC SERPL CALC-MCNC: 104 MG/DL (ref 0–99)
POTASSIUM SERPL-SCNC: 4.6 MMOL/L (ref 3.5–5.2)
PROT SERPL-MCNC: 6.8 G/DL (ref 6–8.5)
SODIUM SERPL-SCNC: 142 MMOL/L (ref 134–144)
TRIGL SERPL-MCNC: 117 MG/DL (ref 0–149)
TSH SERPL DL<=0.005 MIU/L-ACNC: 3.87 UIU/ML (ref 0.45–4.5)
VLDLC SERPL CALC-MCNC: 23 MG/DL (ref 5–40)

## 2017-06-09 NOTE — PROGRESS NOTES
Sugar, salt, and kidney levels all ok. Cholesterol close to goal. con't current regimen. Work on lifestyle changes for cholesterol.

## 2017-11-14 DIAGNOSIS — I25.10 CORONARY ARTERY DISEASE INVOLVING NATIVE CORONARY ARTERY WITHOUT ANGINA PECTORIS: ICD-10-CM

## 2017-11-14 DIAGNOSIS — I10 ESSENTIAL HYPERTENSION: ICD-10-CM

## 2017-11-15 RX ORDER — IRBESARTAN AND HYDROCHLOROTHIAZIDE 300; 12.5 MG/1; MG/1
TABLET, FILM COATED ORAL
Qty: 90 TAB | Refills: 3 | Status: SHIPPED | OUTPATIENT
Start: 2017-11-15 | End: 2018-12-13 | Stop reason: SDUPTHER

## 2017-12-07 ENCOUNTER — OFFICE VISIT (OUTPATIENT)
Dept: FAMILY MEDICINE CLINIC | Age: 72
End: 2017-12-07

## 2017-12-07 VITALS
OXYGEN SATURATION: 99 % | HEIGHT: 66 IN | HEART RATE: 69 BPM | SYSTOLIC BLOOD PRESSURE: 134 MMHG | BODY MASS INDEX: 31.66 KG/M2 | TEMPERATURE: 98.5 F | DIASTOLIC BLOOD PRESSURE: 62 MMHG | WEIGHT: 197 LBS

## 2017-12-07 DIAGNOSIS — J06.9 VIRAL UPPER RESPIRATORY TRACT INFECTION: ICD-10-CM

## 2017-12-07 DIAGNOSIS — K86.1 CHRONIC PANCREATITIS, UNSPECIFIED PANCREATITIS TYPE (HCC): ICD-10-CM

## 2017-12-07 DIAGNOSIS — E78.00 PURE HYPERCHOLESTEROLEMIA: ICD-10-CM

## 2017-12-07 DIAGNOSIS — I25.10 CORONARY ARTERY DISEASE INVOLVING NATIVE CORONARY ARTERY OF NATIVE HEART WITHOUT ANGINA PECTORIS: Primary | ICD-10-CM

## 2017-12-07 DIAGNOSIS — E03.9 ACQUIRED HYPOTHYROIDISM: ICD-10-CM

## 2017-12-07 DIAGNOSIS — I10 ESSENTIAL HYPERTENSION: ICD-10-CM

## 2017-12-07 RX ORDER — DEXAMETHASONE 6 MG/1
6 TABLET ORAL ONCE
Qty: 1 TAB | Refills: 0 | Status: SHIPPED | OUTPATIENT
Start: 2017-12-07 | End: 2017-12-07

## 2017-12-07 NOTE — PROGRESS NOTES
Atul Berkowitz is a 81 y.o. female presenting for/with:    Cough (chest cold)    HPI:  Symptoms include cough, mild congestion. No f/c. No heme. stable since that time. Evaluation to date: none. Treatment to date: fluids, delsym, helping. Follow- up for hypothyroidism. Current dose of levothyroxine 75mcg. Current symptoms: none. TSH in goal.  Lab Results   Component Value Date/Time    TSH 3.870 06/08/2017 10:10 AM    TSH 2.190 05/16/2016 09:00 AM     Hyperlipidemia. Back on pravastatin, taking 40mg daily. alesha well, no further rash. Lab Results   Component Value Date/Time    Cholesterol, total 181 06/08/2017 10:10 AM    HDL Cholesterol 54 06/08/2017 10:10 AM    LDL, calculated 104 06/08/2017 10:10 AM    VLDL, calculated 23 06/08/2017 10:10 AM    Triglyceride 117 06/08/2017 10:10 AM    CHOL/HDL Ratio 5.0 03/03/2014 04:54 AM     Lab Results   Component Value Date/Time    ALT (SGPT) 13 06/08/2017 10:10 AM    AST (SGOT) 16 06/08/2017 10:10 AM    Alk. phosphatase 70 06/08/2017 10:10 AM    Bilirubin, direct 0.11 09/28/2016 09:41 AM    Bilirubin, total 0.3 06/08/2017 10:10 AM     PMH, SH, Medications/Allergies: reviewed, on chart. ROS:  Constitutional: No fever, chills or weight loss  Respiratory: No cough, SOB   CV: No chest pain or Palpitations    Exam:  Visit Vitals    /62    Pulse 69    Temp 98.5 °F (36.9 °C) (Oral)    Ht 5' 6\" (1.676 m)    Wt 197 lb (89.4 kg)    SpO2 99%    BMI 31.8 kg/m2     Wt Readings from Last 3 Encounters:   12/07/17 197 lb (89.4 kg)   06/08/17 203 lb (92.1 kg)   03/09/17 201 lb (91.2 kg)     BP Readings from Last 3 Encounters:   12/07/17 134/62   06/08/17 139/64   03/09/17 126/63     Physical Examination: General appearance - alert, well appearing, and in no distress  Mental status - alert, oriented to person, place, and time  Eyes - pupils equal and reactive, extraocular eye movements intact  ENT - External ears normal, Ext nose normal. Normal lips.  OP pink, moist.  Neck - supple, no significant adenopathy, no thyromegaly or mass. No erythema to upper neck and chest, no eruption to upper neck, cheeks. A/P:  URI sx. Mild. tx with delsym, fluids. Monitor. Can use dexamethasone 6mg po x1 if not improving as long as no sx c/w CAP. Eczema  Well controlled now on diprolene. Seeing Dr. Ayden Walton for that in [facility] off of [address]. Off periactin. HTN  Doing well. con't  Coreg, avalide, norvasc, ASA at current doses. Plan recheck labs summer 2018    Lipids and CHD  In goal back on pravastatin 40 qd, alesha well. Plan recheck labs summer 2018    Hypothyroid  In goal. con't current tx. Plan recheck labs summer 2018    Hx chronic pancreatitis  No sx in a long time. Check lipase with next labs summer 2018, if normal, will retire dx. F/U 6mo/PRN.

## 2017-12-07 NOTE — MR AVS SNAPSHOT
Visit Information Date & Time Provider Department Dept. Phone Encounter #  
 12/7/2017  9:30 AM Ernie Sosa MD [address] [place] [address] [id number] Follow-up Instructions Return in about 6 months (around 6/7/2018). Follow-up and Disposition History Upcoming Health Maintenance Date Due COLONOSCOPY 1/31/1965 GLAUCOMA SCREENING Q2Y 1/31/2012 MEDICARE YEARLY EXAM 6/9/2018 BREAST CANCER SCRN MAMMOGRAM 7/20/2019 DTaP/Tdap/Td series (2 - Td) 9/15/2027 Allergies as of 12/7/2017  Review Complete On: 12/7/2017 By: Ernie Sosa MD  
  
 Severity Noted Reaction Type Reactions Nitrate Analogues  03/03/2014   Intolerance Other (comments) Severe headache Bactrim [Sulfamethoprim] Low 12/12/2016   Topical Rash Current Immunizations  Reviewed on 9/18/2017 Name Date Influenza High Dose Vaccine PF 9/15/2017, 9/28/2016 Influenza Vaccine 10/14/2015, 10/4/2013 Pneumococcal Conjugate (PCV-13) 6/8/2017 Pneumococcal Polysaccharide (PPSV-23) 3/4/2014 10:40 AM  
 Tdap 9/15/2017 Not reviewed this visit You Were Diagnosed With   
  
 Codes Comments Coronary artery disease involving native coronary artery of native heart without angina pectoris    -  Primary ICD-10-CM: I25.10 ICD-9-CM: 414.01 Chronic pancreatitis, unspecified pancreatitis type ([facility]     ICD-10-CM: K86.1 ICD-9-CM: 025.7 Essential hypertension     ICD-10-CM: I10 
ICD-9-CM: 401.9 Pure hypercholesterolemia     ICD-10-CM: E78.00 ICD-9-CM: 272.0 Acquired hypothyroidism     ICD-10-CM: E03.9 ICD-9-CM: 368. 9 Viral upper respiratory tract infection     ICD-10-CM: J06.9, B97.89 ICD-9-CM: 465.9 Vitals BP Pulse Temp Height(growth percentile) Weight(growth percentile) SpO2  
 134/62 69 98.5 °F (36.9 °C) (Oral) 5' 6\" (1.676 m) 197 lb (89.4 kg) 99% BMI OB Status Smoking Status 31.8 kg/m2 Menopause Never Smoker BMI and BSA Data Body Mass Index Body Surface Area  
 31.8 kg/m 2 2.04 m 2 Preferred Pharmacy Pharmacy Name Phone [facility], [place] - [address] [phone] Your Updated Medication List  
  
   
This list is accurate as of: 12/7/17 10:07 AM.  Always use your most recent med list. amLODIPine 5 mg tablet Commonly known as:  Wandra Mei Take 1 Tab by mouth daily. aspirin 81 mg chewable tablet Take 1 Tab by mouth daily. Over the counter  
  
 carvedilol 25 mg tablet Commonly known as:  May Hush Take 1 Tab by mouth two (2) times daily (with meals). Indications: pressure and heart  
  
 cholecalciferol (VITAMIN D3) 5,000 unit Tab tablet Commonly known as:  VITAMIN D3 Take 1 Tab by mouth daily. dexamethasone 6 mg tablet Commonly known as:  DECADRON Take 1 Tab by mouth once for 1 dose. Indications: lungs  
  
 irbesartan-hydroCHLOROthiazide 300-12.5 mg per tablet Commonly known as:  AVALIDE  
TAKE 1 TABLET EVERY DAY  FOR  PRESSURE  
  
 levothyroxine 75 mcg tablet Commonly known as:  SYNTHROID  
TAKE 1 TABLET EVERY DAY BEFORE BREAKFAST  FOR  LOW  THYROID  
  
 nitroglycerin 0.4 mg SL tablet Commonly known as:  NITROSTAT  
1 Tab by SubLINGual route every five (5) minutes as needed for Chest Pain (call 911 if not relieved by 3). omeprazole 40 mg capsule Commonly known as:  PRILOSEC  
TAKE 1 CAPSULE EVERY DAY  
  
 pravastatin 40 mg tablet Commonly known as:  PRAVACHOL Take 1 Tab by mouth nightly. Indications: heart and cholesterol Prescriptions Sent to Pharmacy Refills  
 dexamethasone (DECADRON) 6 mg tablet 0 Sig: Take 1 Tab by mouth once for 1 dose. Indications: lungs Class: Normal  
 Pharmacy: [facility] Ph #: [phone] Route: Oral  
  
Follow-up Instructions Return in about 6 months (around 6/7/2018). Patient Instructions If you have any questions regarding [employer], you may call [employer] support at [phone]. Introducing [facility] SERVICES! Dear Gilberto Maravilla: Thank you for requesting a [employer] account. Our records indicate that you already have an active [employer] account. You can access your account anytime at [email or web address] Did you know that you can access your hospital and ER discharge instructions at any time in [employer]? You can also review all of your test results from your hospital stay or ER visit. Additional Information If you have questions, please visit the Frequently Asked Questions section of the [employer] website at [email or web address] Remember, [employer] is NOT to be used for urgent needs. For medical emergencies, dial 911. Now available from your iPhone and Android! Please provide this summary of care documentation to your next provider. Your primary care clinician is listed as Komal Merino. If you have any questions after today's visit, please call [phone].

## 2018-01-16 DIAGNOSIS — R10.13 EPIGASTRIC PAIN: ICD-10-CM

## 2018-01-16 DIAGNOSIS — E03.4 HYPOTHYROIDISM DUE TO ACQUIRED ATROPHY OF THYROID: ICD-10-CM

## 2018-01-16 DIAGNOSIS — K21.9 GASTROESOPHAGEAL REFLUX DISEASE WITHOUT ESOPHAGITIS: ICD-10-CM

## 2018-01-18 RX ORDER — OMEPRAZOLE 40 MG/1
CAPSULE, DELAYED RELEASE ORAL
Qty: 90 CAP | Refills: 3 | Status: SHIPPED | OUTPATIENT
Start: 2018-01-18 | End: 2018-12-13 | Stop reason: SDUPTHER

## 2018-01-18 RX ORDER — AMLODIPINE BESYLATE 5 MG/1
TABLET ORAL
Qty: 90 TAB | Refills: 3 | Status: SHIPPED | OUTPATIENT
Start: 2018-01-18 | End: 2018-12-13 | Stop reason: SDUPTHER

## 2018-01-18 RX ORDER — LEVOTHYROXINE SODIUM 75 UG/1
TABLET ORAL
Qty: 90 TAB | Refills: 3 | Status: SHIPPED | OUTPATIENT
Start: 2018-01-18 | End: 2018-06-11 | Stop reason: SDUPTHER

## 2018-06-08 ENCOUNTER — OFFICE VISIT (OUTPATIENT)
Dept: FAMILY MEDICINE CLINIC | Age: 73
End: 2018-06-08

## 2018-06-08 VITALS
HEART RATE: 69 BPM | BODY MASS INDEX: 30.92 KG/M2 | TEMPERATURE: 97.8 F | DIASTOLIC BLOOD PRESSURE: 70 MMHG | HEIGHT: 67 IN | RESPIRATION RATE: 14 BRPM | WEIGHT: 197 LBS | SYSTOLIC BLOOD PRESSURE: 138 MMHG | OXYGEN SATURATION: 97 %

## 2018-06-08 DIAGNOSIS — E78.00 PURE HYPERCHOLESTEROLEMIA: ICD-10-CM

## 2018-06-08 DIAGNOSIS — E03.4 HYPOTHYROIDISM DUE TO ACQUIRED ATROPHY OF THYROID: ICD-10-CM

## 2018-06-08 DIAGNOSIS — K86.1 CHRONIC PANCREATITIS, UNSPECIFIED PANCREATITIS TYPE (HCC): ICD-10-CM

## 2018-06-08 DIAGNOSIS — Z13.31 SCREENING FOR DEPRESSION: ICD-10-CM

## 2018-06-08 DIAGNOSIS — Z12.39 SCREENING BREAST EXAMINATION: ICD-10-CM

## 2018-06-08 DIAGNOSIS — I10 ESSENTIAL HYPERTENSION: ICD-10-CM

## 2018-06-08 DIAGNOSIS — Z00.00 MEDICARE ANNUAL WELLNESS VISIT, SUBSEQUENT: Primary | ICD-10-CM

## 2018-06-08 DIAGNOSIS — Z12.39 SCREENING FOR BREAST CANCER: ICD-10-CM

## 2018-06-08 DIAGNOSIS — Z13.39 SCREENING FOR ALCOHOLISM: ICD-10-CM

## 2018-06-08 DIAGNOSIS — I10 ESSENTIAL HYPERTENSION WITH GOAL BLOOD PRESSURE LESS THAN 140/90: ICD-10-CM

## 2018-06-08 DIAGNOSIS — I25.10 CORONARY ARTERY DISEASE INVOLVING NATIVE CORONARY ARTERY OF NATIVE HEART WITHOUT ANGINA PECTORIS: ICD-10-CM

## 2018-06-08 RX ORDER — PRAVASTATIN SODIUM 40 MG/1
40 TABLET ORAL
Qty: 90 TAB | Refills: 3 | Status: SHIPPED | OUTPATIENT
Start: 2018-06-08 | End: 2019-06-05 | Stop reason: SDUPTHER

## 2018-06-08 RX ORDER — CARVEDILOL 25 MG/1
25 TABLET ORAL 2 TIMES DAILY WITH MEALS
Qty: 180 TAB | Refills: 3 | Status: SHIPPED | OUTPATIENT
Start: 2018-06-08 | End: 2019-07-29 | Stop reason: SDUPTHER

## 2018-06-08 NOTE — PATIENT INSTRUCTIONS
Medicare Wellness Visit, Female    The best way to live healthy is to have a lifestyle where you eat a well-balanced diet, exercise regularly, limit alcohol use, and quit all forms of tobacco/nicotine, if applicable. Regular preventive services are another way to keep healthy. Preventive services (vaccines, screening tests, monitoring & exams) can help personalize your care plan, which helps you manage your own care. Screening tests can find health problems at the earliest stages, when they are easiest to treat. [facility] follows the current, evidence-based guidelines published by the [facility] ([facility]) when recommending preventive services for our patients. Because we follow these guidelines, sometimes recommendations change over time as research supports it. (For example, mammograms used to be recommended annually. Even though Medicare will still pay for an annual mammogram, the newer guidelines recommend a mammogram every two years for women of average risk.)    Of course, you and your provider may decide to screen more often for some diseases, based on your risk and co-morbidities (chronic disease you are already diagnosed with). Preventive services for you include:    - Medicare offers their members a free annual wellness visit, which is time for you and your primary care provider to discuss and plan for your preventive service needs. Take advantage of this benefit every year!    -All people over age 72 should receive the recommended pneumonia vaccines. Current USPSTF guidelines recommend a series of two vaccines for the best pneumonia protection.     -All adults should have a yearly flu vaccine and a tetanus vaccine every 10 years. All adults age 61 years should receive a shingles vaccine once in their lifetime.      -A bone mass density test is recommended when a woman turns 65 to screen for osteoporosis.  This test is only recommended once as a screening. Some providers will use this same test as a disease monitoring tool if you already have osteoporosis. -All adults age 38-68 years who are overweight should have a diabetes screening test once every three years.     -Other screening tests & preventive services for persons with diabetes include: an eye exam to screen for diabetic retinopathy, a kidney function test, a foot exam, and stricter control over your cholesterol.     -Cardiovascular screening for adults with routine risk involves an electrocardiogram (ECG) at intervals determined by the provider.     -Colorectal cancer screenings should be done for adults age 54-65 years with normal risk. There are a number of acceptable methods of screening for this type of cancer. Each test has its own benefits and drawbacks. Discuss with your provider what is most appropriate for you during your annual wellness visit. The different tests include: colonoscopy (considered the best screening method), a fecal occult blood test, a fecal DNA test, and sigmoidoscopy. -Breast cancer screenings are recommended every other year for women of normal risk age 54-69 years. Here is a list of your current Health Maintenance items (your personalized list of preventive services) with a due date:  Health Maintenance Due   Topic Date Due    Colonoscopy  01/31/1963   If you have any questions regarding [employer], you may call [employer] support at [phone].

## 2018-06-08 NOTE — PROGRESS NOTES
Puneet Caceres is a 72 y.o. female presenting for/with: Annual Wellness Visit    HPI:  Follow- up for hypothyroidism. Current dose of levothyroxine 75mcg. Current symptoms: none. TSH in goal.  Lab Results   Component Value Date/Time    TSH 3.870 06/08/2017 10:10 AM    TSH 2.190 05/16/2016 09:00 AM     Hyperlipidemia. Back on pravastatin, taking 40mg daily. alesha well, no further rash. Lab Results   Component Value Date/Time    Cholesterol, total 181 06/08/2017 10:10 AM    HDL Cholesterol 54 06/08/2017 10:10 AM    LDL, calculated 104 (H) 06/08/2017 10:10 AM    VLDL, calculated 23 06/08/2017 10:10 AM    Triglyceride 117 06/08/2017 10:10 AM    CHOL/HDL Ratio 5.0 03/03/2014 04:54 AM     Lab Results   Component Value Date/Time    ALT (SGPT) 13 06/08/2017 10:10 AM    AST (SGOT) 16 06/08/2017 10:10 AM    Alk. phosphatase 70 06/08/2017 10:10 AM    Bilirubin, direct 0.11 09/28/2016 09:41 AM    Bilirubin, total 0.3 06/08/2017 10:10 AM     PMH, SH, Medications/Allergies: reviewed, on chart. ROS:  Constitutional: No fever, chills or weight loss  Respiratory: No cough, SOB   CV: No chest pain or Palpitations    Exam:  Visit Vitals    /70 (BP 1 Location: Left arm, BP Patient Position: Sitting)    Pulse 69    Temp 97.8 °F (36.6 °C) (Oral)    Resp 14    Ht 5' 7\" (1.702 m)    Wt 197 lb (89.4 kg)    SpO2 97%    BMI 30.85 kg/m2     Wt Readings from Last 3 Encounters:   06/08/18 197 lb (89.4 kg)   12/07/17 197 lb (89.4 kg)   06/08/17 203 lb (92.1 kg)     BP Readings from Last 3 Encounters:   06/08/18 138/70   12/07/17 134/62   06/08/17 139/64     Physical Examination: General appearance - alert, well appearing, and in no distress  Mental status - alert, oriented to person, place, and time  Eyes - pupils equal and reactive, extraocular eye movements intact  ENT - External ears normal, Ext nose normal. Normal lips. OP pink, moist.  Neck - supple, no significant adenopathy, no thyromegaly or mass.  No erythema to upper neck and chest, no eruption to upper neck, cheeks. A/P:  Eczema  Well controlled now on diprolene. Seeing Dr. Melba Buck for that in [place] off of [address]. Off periactin. Next visit August.    HTN  Doing well. con't  Coreg, avalide, norvasc, ASA at current doses. recheck labs    Lipids and CHD  In goal back on pravastatin 40 qd, alesha well. Recheck labs     Hypothyroid  In goal. con't current tx. recheck labs    Hx chronic pancreatitis  No sx in a long time. Check lipase. If normal, will retire dx to the Pending sale to [facility] [employer]    F/U 6mo/PRN. ______________________________________________________________________    Brand Boas is a 72 y.o. female and presents for annual Medicare Wellness Visit. Problem List: Reviewed with patient and discussed risk factors. Patient Active Problem List   Diagnosis Code    Eczema L30.9    Acne rosacea L71.9    S/P cholecystectomy Z90.49    IBS (irritable bowel syndrome) K58.9    Migraine G43.909    Chronic pancreatitis (HCC) K86.1    HTN (hypertension) I10    Hypothyroidism E03.9    GERD (gastroesophageal reflux disease) K21.9    Anxiety F41.9    Family history of ischemic heart disease Z80.55    CAD (coronary artery disease), native coronary artery I25.10    S/P cardiac cath Z98.890    Hyperlipemia E78.5    Advance directive discussed with patient Z71.89       1. Have you been to the ER, urgent care clinic since your last visit? Hospitalized since your last visit? No    2. Have you seen or consulted any other health care providers outside of the [address] [place] [address] since your last visit? Include any pap smears or colon screening. No    Current medical providers:  Patient Care Team:  Radha Ferro MD as PCP - General (Family Practice)    PM, , Medications/Allergies: reviewed, on chart. Female Alcohol Screening: On any occasion during the past 3 months, have you had more than 3 drinks at one sitting containing alcohol?   No    Do you average more than 7 drinks per week? No        ROS:  Constitutional: No fever, chills or weight loss  Respiratory: No cough, SOB   CV: No chest pain or Palpitations    Objective:  Visit Vitals    /70 (BP 1 Location: Left arm, BP Patient Position: Sitting)    Pulse 69    Temp 97.8 °F (36.6 °C) (Oral)    Resp 14    Ht 5' 7\" (1.702 m)    Wt 197 lb (89.4 kg)    SpO2 97%    BMI 30.85 kg/m2    Body mass index is 30.85 kg/(m^2). Assessment of cognitive impairment: Alert and oriented x 3  Mini-co Clock, 3/3 recall    Depression Screen:   PHQ over the last two weeks 2018   PHQ Not Done -   Little interest or pleasure in doing things Not at all   Feeling down, depressed or hopeless Not at all   Total Score PHQ 2 0   Trouble falling or staying asleep, or sleeping too much -   Feeling tired or having little energy -   Poor appetite or overeating -   Feeling bad about yourself - or that you are a failure or have let yourself or your family down -   Trouble concentrating on things such as school, work, reading or watching TV -   Moving or speaking so slowly that other people could have noticed; or the opposite being so fidgety that others notice -   Thoughts of being better off dead, or hurting yourself in some way -   PHQ 9 Score -   How difficult have these problems made it for you to do your work, take care of your home and get along with others -       Fall Risk Assessment:    Fall Risk Assessment, last 12 mths 2018   Able to walk? Yes   Fall in past 12 months? No       Functional Ability:   Does the patient exhibit a steady gait? yes   How long did it take the patient to get up and walk from a sitting position? 1   Is the patient self reliant?  (ie can do own laundry, meals, household chores)  yes     Does the patient handle his/her own medications? yes     Does the patient handle his/her own money? yes     Is the patients home safe (ie good lighting, handrails on stairs and bath, etc.)? yes     Did you notice or did patient express any hearing difficulties? yes     Did you notice or did patient express any vision difficulties? no       Advance Care Planning:   Patient was offered the opportunity to discuss advance care planning:  yes     Does patient have an Advance Directive:  yes   If no, did you provide information on Caring Connections? yes       Plan:    [place] due 2019 for last check (if normal). Orders Placed This Encounter    Depression Screen Annual    PERRI 3D YAHIR W MAMMO BI SCREENING INCL CAD    LIPASE    LIPID PANEL    METABOLIC PANEL, COMPREHENSIVE    TSH RFX ON ABNORMAL TO FREE T4    Annual  Alcohol Screen 15 min ()    carvedilol (COREG) 25 mg tablet    pravastatin (PRAVACHOL) 40 mg tablet       Health Maintenance   Topic Date Due    MEDICARE YEARLY EXAM  06/09/2018    Influenza Age 5 to Adult  08/01/2018    COLONOSCOPY  04/30/2019    BREAST CANCER SCRN MAMMOGRAM  07/20/2019    GLAUCOMA SCREENING Q2Y  11/21/2019    DTaP/Tdap/Td series (2 - Td) 09/15/2027    Hepatitis C Screening  Completed    Bone Densitometry (Dexa) Screening  Completed    ZOSTER VACCINE AGE 60>  Completed    Pneumococcal 65+ Low/Medium Risk  Completed       *Patient verbalized understanding and agreement with the plan. A copy of the After Visit Summary with personalized health plan was given to the patient today.

## 2018-06-08 NOTE — MR AVS SNAPSHOT
[address] [place] [address],[address] [address] [phone] Patient: Augusta Abdalla MRN: [id number] WQW:[id number] Visit Information Date & Time Provider Department Dept. Phone Encounter #  
 6/8/2018  9:30 AM Leda Meyer MD [address] [place] [address] [id number] Follow-up Instructions Return in about 6 months (around 12/8/2018). Follow-up and Disposition History Upcoming Health Maintenance Date Due  
 MEDICARE YEARLY EXAM 6/9/2018 Influenza Age 5 to Adult 8/1/2018 COLONOSCOPY 4/30/2019 BREAST CANCER SCRN MAMMOGRAM 7/20/2019 GLAUCOMA SCREENING Q2Y 11/21/2019 DTaP/Tdap/Td series (2 - Td) 9/15/2027 Allergies as of 6/8/2018  Review Complete On: 6/8/2018 By: Leda Meyer MD  
  
 Severity Noted Reaction Type Reactions Nitrate Analogues  03/03/2014   Intolerance Other (comments) Severe headache Bactrim [Sulfamethoprim] Low 12/12/2016   Topical Rash Current Immunizations  Reviewed on 9/18/2017 Name Date Influenza High Dose Vaccine PF 9/15/2017, 9/28/2016 Influenza Vaccine 10/14/2015, 10/4/2013 Pneumococcal Conjugate (PCV-13) 6/8/2017 Pneumococcal Polysaccharide (PPSV-23) 3/4/2014 10:40 AM  
 Tdap 9/15/2017 Not reviewed this visit You Were Diagnosed With   
  
 Codes Comments Medicare annual wellness visit, subsequent    -  Primary ICD-10-CM: Z00.00 ICD-9-CM: V70.0 Screening for alcoholism     ICD-10-CM: Z13.89 ICD-9-CM: V79.1 Screening for depression     ICD-10-CM: Z13.89 ICD-9-CM: V79.0 Hypothyroidism due to acquired atrophy of thyroid     ICD-10-CM: E03.4 ICD-9-CM: 244.8, 246.8 Coronary artery disease involving native coronary artery of native heart without angina pectoris     ICD-10-CM: I25.10 ICD-9-CM: 414.01   
 Pure hypercholesterolemia     ICD-10-CM: E78.00 ICD-9-CM: 272.0  Essential hypertension     ICD-10-CM: I10 
 ICD-9-CM: 401.9 Screening breast examination     ICD-10-CM: Z12.31 
ICD-9-CM: V76.10 Screening for breast cancer     ICD-10-CM: Z12.31 
ICD-9-CM: V76.10 Chronic pancreatitis, unspecified pancreatitis type ([facility]     ICD-10-CM: K86.1 ICD-9-CM: 793.7 Essential hypertension with goal blood pressure less than 140/90     ICD-10-CM: I10 
ICD-9-CM: 401.9 Vitals BP Pulse Temp Resp Height(growth percentile) Weight(growth percentile) 138/70 (BP 1 Location: Left arm, BP Patient Position: Sitting) 69 97.8 °F (36.6 °C) (Oral) 14 5' 7\" (1.702 m) 197 lb (89.4 kg) SpO2 BMI OB Status Smoking Status 97% 30.85 kg/m2 Menopause Never Smoker BMI and BSA Data Body Mass Index Body Surface Area  
 30.85 kg/m 2 2.06 m 2 Preferred Pharmacy Pharmacy Name Phone [address] [phone] Your Updated Medication List  
  
   
This list is accurate as of 6/8/18 10:33 AM.  Always use your most recent med list. amLODIPine 5 mg tablet Commonly known as:  Taisha Ape TAKE 1 TABLET EVERY DAY  
  
 aspirin 81 mg chewable tablet Take 1 Tab by mouth daily. Over the counter  
  
 carvedilol 25 mg tablet Commonly known as:  Nadira Salts Take 1 Tab by mouth two (2) times daily (with meals). Indications: pressure and heart  
  
 cholecalciferol (VITAMIN D3) 5,000 unit Tab tablet Commonly known as:  VITAMIN D3 Take 1 Tab by mouth daily. irbesartan-hydroCHLOROthiazide 300-12.5 mg per tablet Commonly known as:  AVALIDE  
TAKE 1 TABLET EVERY DAY  FOR  PRESSURE  
  
 levothyroxine 75 mcg tablet Commonly known as:  SYNTHROID  
TAKE 1 TABLET EVERY DAY BEFORE BREAKFAST  FOR  LOW  THYROID  
  
 nitroglycerin 0.4 mg SL tablet Commonly known as:  NITROSTAT  
1 Tab by SubLINGual route every five (5) minutes as needed for Chest Pain (call 911 if not relieved by 3). omeprazole 40 mg capsule Commonly known as:  PRILOSEC  
TAKE 1 CAPSULE EVERY DAY  
  
 pravastatin 40 mg tablet Commonly known as:  PRAVACHOL Take 1 Tab by mouth nightly. Indications: heart and cholesterol Prescriptions Sent to Pharmacy Refills  
 carvedilol (COREG) 25 mg tablet 3 Sig: Take 1 Tab by mouth two (2) times daily (with meals). Indications: pressure and heart Class: Normal  
 Pharmacy: [address] [place] [address], [address] [place] [address] Ph #: [phone] Route: Oral  
 pravastatin (PRAVACHOL) 40 mg tablet 3 Sig: Take 1 Tab by mouth nightly. Indications: heart and cholesterol Class: Normal  
 Pharmacy: [address] [place] [address], [address] [place] [address] Ph #: [phone] Route: Oral  
  
We Performed the Following [address] [[id number] [facility]PCS] LIPASE [id number] CPT(R)] LIPID PANEL [57351 CPT(R)] METABOLIC PANEL, COMPREHENSIVE [55577 CPT(R)] DE ANNUAL ALCOHOL SCREEN 15 MIN [id number] [facility]] TSH RFX ON ABNORMAL TO FREE T4 [[id number] Custom] Follow-up Instructions Return in about 6 months (around 12/8/2018). To-Do List   
 07/18/2018 Imaging:  PERRI 3D YAHIR W MAMMO BI SCREENING INCL CAD Patient Instructions Medicare Wellness Visit, Female The best way to live healthy is to have a lifestyle where you eat a well-balanced diet, exercise regularly, limit alcohol use, and quit all forms of tobacco/nicotine, if applicable. Regular preventive services are another way to keep healthy. Preventive services (vaccines, screening tests, monitoring & exams) can help personalize your care plan, which helps you manage your own care. Screening tests can find health problems at the earliest stages, when they are easiest to treat.  
  
[facility] follows the current, evidence-based guidelines published by the [facility] H[employer]to Mill[employer]ent (USPSTF) when recommending preventive services for our patients. Because we follow these guidelines, sometimes recommendations change over time as research supports it. (For example, mammograms used to be recommended annually. Even though Medicare will still pay for an annual mammogram, the newer guidelines recommend a mammogram every two years for women of average risk.) Of course, you and your provider may decide to screen more often for some diseases, based on your risk and co-morbidities (chronic disease you are already diagnosed with). Preventive services for you include: - Medicare offers their members a free annual wellness visit, which is time for you and your primary care provider to discuss and plan for your preventive service needs. Take advantage of this benefit every year! 
 
-All people over age 72 should receive the recommended pneumonia vaccines. Current USPSTF guidelines recommend a series of two vaccines for the best pneumonia protection.  
 
-All adults should have a yearly flu vaccine and a tetanus vaccine every 10 years. All adults age 61 years should receive a shingles vaccine once in their lifetime.   
 
-A bone mass density test is recommended when a woman turns 65 to screen for osteoporosis. This test is only recommended once as a screening. Some providers will use this same test as a disease monitoring tool if you already have osteoporosis.  
 
-All adults age 38-68 years who are overweight should have a diabetes screening test once every three years.  
 
-Other screening tests & preventive services for persons with diabetes include: an eye exam to screen for diabetic retinopathy, a kidney function test, a foot exam, and stricter control over your cholesterol.  
 
-Cardiovascular screening for adults with routine risk involves an electrocardiogram (ECG) at intervals determined by the provider.  
 
-Colorectal cancer screenings should be done for adults age 54-65 years with normal risk. There are a number of acceptable methods of screening for this type of cancer. Each test has its own benefits and drawbacks. Discuss with your provider what is most appropriate for you during your annual wellness visit. The different tests include: colonoscopy (considered the best screening method), a fecal occult blood test, a fecal DNA test, and sigmoidoscopy. -Breast cancer screenings are recommended every other year for women of normal risk age 54-69 years. Here is a list of your current Health Maintenance items (your personalized list of preventive services) with a due date: 
Health Maintenance Due Topic Date Due  
 Colonoscopy  01/31/1963 If you have any questions regarding [employer], you may call [employer] support at [phone]. Patient Instructions History Introducing [facility]! Dear Margarita Varela: Thank you for requesting a [employer] account. Our records indicate that you already have an active [employer] account. You can access your account anytime at [email or web address]. [email or web address] Did you know that you can access your hospital and ER discharge instructions at any time in [employer]? You can also review all of your test results from your hospital stay or ER visit. Additional Information If you have questions, please visit the Frequently Asked Questions section of the [employer] website at [email or web address] Remember, [employer] is NOT to be used for urgent needs. For medical emergencies, dial 911. Now available from your iPhone and Android! Please provide this summary of care documentation to your next provider. Your primary care clinician is listed as Juan F Merino. If you have any questions after today's visit, please call [phone].

## 2018-06-09 LAB
ALBUMIN SERPL-MCNC: 4.8 G/DL (ref 3.5–4.8)
ALBUMIN/GLOB SERPL: 1.9 {RATIO} (ref 1.2–2.2)
ALP SERPL-CCNC: 68 IU/L (ref 39–117)
ALT SERPL-CCNC: 14 IU/L (ref 0–32)
AST SERPL-CCNC: 17 IU/L (ref 0–40)
BILIRUB SERPL-MCNC: 0.5 MG/DL (ref 0–1.2)
BUN SERPL-MCNC: 15 MG/DL (ref 8–27)
BUN/CREAT SERPL: 12 (ref 12–28)
CALCIUM SERPL-MCNC: 9.9 MG/DL (ref 8.7–10.3)
CHLORIDE SERPL-SCNC: 101 MMOL/L (ref 96–106)
CHOLEST SERPL-MCNC: 178 MG/DL (ref 100–199)
CO2 SERPL-SCNC: 27 MMOL/L (ref 18–29)
CREAT SERPL-MCNC: 1.25 MG/DL (ref 0.57–1)
GFR SERPLBLD CREATININE-BSD FMLA CKD-EPI: 43 ML/MIN/1.73
GFR SERPLBLD CREATININE-BSD FMLA CKD-EPI: 50 ML/MIN/1.73
GLOBULIN SER CALC-MCNC: 2.5 G/DL (ref 1.5–4.5)
GLUCOSE SERPL-MCNC: 131 MG/DL (ref 65–99)
HDLC SERPL-MCNC: 59 MG/DL
LDLC SERPL CALC-MCNC: 97 MG/DL (ref 0–99)
LIPASE SERPL-CCNC: 54 U/L (ref 14–85)
POTASSIUM SERPL-SCNC: 4.7 MMOL/L (ref 3.5–5.2)
PROT SERPL-MCNC: 7.3 G/DL (ref 6–8.5)
SODIUM SERPL-SCNC: 142 MMOL/L (ref 134–144)
T4 FREE SERPL-MCNC: 1.61 NG/DL (ref 0.82–1.77)
TRIGL SERPL-MCNC: 109 MG/DL (ref 0–149)
TSH SERPL DL<=0.005 MIU/L-ACNC: 4.79 UIU/ML (ref 0.45–4.5)
VLDLC SERPL CALC-MCNC: 22 MG/DL (ref 5–40)

## 2018-06-11 RX ORDER — LEVOTHYROXINE SODIUM 88 UG/1
88 TABLET ORAL
Qty: 90 TAB | Refills: 3 | Status: SHIPPED | OUTPATIENT
Start: 2018-06-11 | End: 2019-07-29 | Stop reason: SDUPTHER

## 2018-06-11 NOTE — PROGRESS NOTES
Pancreas seems to be fully healed. Thyroid pill too weak. Need to boost, recommend 88mcg daily. New order sent. Cholesterol good. Liver, kidneys ok.

## 2018-06-15 ENCOUNTER — OFFICE VISIT (OUTPATIENT)
Dept: FAMILY MEDICINE CLINIC | Age: 73
End: 2018-06-15

## 2018-06-15 VITALS
OXYGEN SATURATION: 98 % | SYSTOLIC BLOOD PRESSURE: 145 MMHG | HEART RATE: 73 BPM | RESPIRATION RATE: 19 BRPM | HEIGHT: 67 IN | DIASTOLIC BLOOD PRESSURE: 80 MMHG | BODY MASS INDEX: 31.36 KG/M2 | WEIGHT: 199.8 LBS

## 2018-06-15 DIAGNOSIS — R21 RASH OF FACE: ICD-10-CM
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
## 2018-06-15 RX ORDER — PREDNISONE 10 MG/1
TABLET ORAL
Qty: 21 TAB | Refills: 0 | Status: SHIPPED | OUTPATIENT
Start: 2018-06-15 | End: 2018-08-28 | Stop reason: ALTCHOICE

## 2018-06-15 RX ORDER — METHYLPREDNISOLONE ACETATE 80 MG/ML
80 INJECTION, SUSPENSION INTRA-ARTICULAR; INTRALESIONAL; INTRAMUSCULAR; SOFT TISSUE ONCE
Qty: 1 VIAL | Refills: 0
Start: 2018-06-15 | End: 2018-06-15

## 2018-06-15 RX ORDER — NITROGLYCERIN 0.4 MG/1
0.4 TABLET SUBLINGUAL
Qty: 25 TAB | Refills: 1 | Status: SHIPPED | OUTPATIENT
Start: 2018-06-15

## 2018-06-15 NOTE — PROGRESS NOTES
Chief Complaint   Patient presents with    Insect Bite     neck and face for the past week.  Rash         HPI:       is a 72 y.o. female. New Issues:  She has an insect bite on her neck and face that has been bothering her for over a week. Has been itching her. Was seen last week for the same issue. Has gotten worse. Now has swelling across her nose and some itchy patches on her forehead and hear her right ear. Has tried putting ice and Benadryl cream.      Allergies   Allergen Reactions    Nitrate Analogues Other (comments)     Severe headache    Bactrim [Sulfamethoprim] Rash       Current Outpatient Prescriptions   Medication Sig    levothyroxine (SYNTHROID) 88 mcg tablet Take 1 Tab by mouth Daily (before breakfast). Indications: low thyroid, new higher dose    carvedilol (COREG) 25 mg tablet Take 1 Tab by mouth two (2) times daily (with meals). Indications: pressure and heart    pravastatin (PRAVACHOL) 40 mg tablet Take 1 Tab by mouth nightly. Indications: heart and cholesterol    amLODIPine (NORVASC) 5 mg tablet TAKE 1 TABLET EVERY DAY    omeprazole (PRILOSEC) 40 mg capsule TAKE 1 CAPSULE EVERY DAY    irbesartan-hydroCHLOROthiazide (AVALIDE) 300-12.5 mg per tablet TAKE 1 TABLET EVERY DAY  FOR  PRESSURE    aspirin 81 mg chewable tablet Take 1 Tab by mouth daily. Over the counter    nitroglycerin (NITROSTAT) 0.4 mg SL tablet 1 Tab by SubLINGual route every five (5) minutes as needed for Chest Pain (call 911 if not relieved by 3).  cholecalciferol, VITAMIN D3, (VITAMIN D3) 5,000 unit tab tablet Take 1 Tab by mouth daily. (Patient taking differently: Take 5,000 Units by mouth two (2) days a week.)     No current facility-administered medications for this visit. Past Medical History:   Diagnosis Date    Anxiety and depression     Chronic headaches .     vascular    Depression     Duodenal ulcer     GERD (gastroesophageal reflux disease)     Hypertension     IBS (irritable bowel syndrome)     Onychomycosis     Pancreatitis     Recurrent sinusitis     S/P cardiac cath 3/4/2014    3/3/14 1st diagonal: 60 %  proximal -- Mid RCA: There was a 55 % stenosis.  Sciatica     reccurrent       Past Surgical History:   Procedure Laterality Date    ABDOMEN SURGERY PROC UNLISTED      HX APPENDECTOMY      HX CHOLECYSTECTOMY         Social History     Social History    Marital status:      Spouse name: N/A    Number of children: N/A    Years of education: N/A     Social History Main Topics    Smoking status: Never Smoker    Smokeless tobacco: Never Used    Alcohol use No    Drug use: No    Sexual activity: No     Other Topics Concern     Service No    Blood Transfusions No    Caffeine Concern No    Occupational Exposure No    Hobby Hazards No    Sleep Concern Yes     not sleeping good    Stress Concern No    Weight Concern No    Special Diet No    Back Care No    Exercise No    Bike Helmet No    Seat Belt Yes    Self-Exams Yes     checks     Social History Narrative       Family History   Problem Relation Age of Onset    Heart Disease Mother     Heart Disease Father        Above history reviewed. ROS:  Denies fever, chills, cough, chest pain, SOB,  nausea, vomiting, or diarrhea. Denies wt loss, wt gain, hemoptysis, hematochezia or melena. Physical Examination:    /80 (BP 1 Location: Left arm, BP Patient Position: Sitting)  Pulse 73  Resp 19  Ht 5' 7\" (1.702 m)  Wt 199 lb 12.8 oz (90.6 kg)  SpO2 98%  BMI 31.29 kg/m2    General: Alert and Ox3, Fluent speech  HEENT:  PERRLA, EOM intact, TMs, turbinates, pharynx normal.  No thyromegaly. No cervical adenopathy. Neck:  Supple, no adenopathy, JVD, mass or bruit  Chest:  Clear to Ausculation, without wheezes, rales, rubs or ronchi  Cardiac: RRR  Extremities:  No cyanosis, clubbing or edema  Neurologic:  Ambulatory without assist, CN 2-12 grossly intact.   Moves all extremities. Skin: Red raised whelp midline anterior neck. Small papules in cluster upper forehead and anterior to right ear. Soft tissue swelling across nasal bridge. Lymphadenopathy: no cervical or supraclavicular nodes    ASSESSMENT AND PLAN:     1. Insect bite, subsequent encounter  Continue Benadryl cream  Consider Zyrtec during the day and Benadryl PO at night  - predniSONE (STERAPRED DS) 10 mg dose pack; See administration instruction per 10mg dose pack  Dispense: 21 Tab; Refill: 0    2. Rash of face  Hold rx for prednisone tabs, if not improved Monday, will start  - METHYLPREDNISOLONE ACETATE INJECTION 80 MG  - OK THER/PROPH/DIAG INJECTION, SUBCUT/IM  - methylPREDNISolone acetate (DEPO-MEDROL) 80 mg/mL injection; 1 mL by IntraMUSCular route once for 1 dose. Dispense: 1 Vial; Refill: 0  - predniSONE (STERAPRED DS) 10 mg dose pack; See administration instruction per 10mg dose pack  Dispense: 21 Tab;  Refill: 0     RTC PRN    Licha Gilmore NP

## 2018-06-15 NOTE — MR AVS SNAPSHOT
[address] [facility] 
 
 
 [address] [employer] [address] [facility],[address] [address] [phone] Patient: Osmani Morales MRN: [id number] TXV:0/06/9911 Visit Information Date & Time Provider Department Dept. Phone Encounter #  
 6/15/2018  1:30 PM Traci Vu NP [address] [facility] [id number] Your Appointments 12/10/2018 10:10 AM  
ESTABLISHED PATIENT with MD Meghna Storey ([facility]) Appt Note: 6 mo f/u  
 [address] [employer] [address] [place] [address],[address] [address] [phone]  
  
   
 [address] [address] Upcoming Health Maintenance Date Due Influenza Age 5 to Adult 8/1/2018 COLONOSCOPY 4/30/2019 MEDICARE YEARLY EXAM 6/9/2019 BREAST CANCER SCRN MAMMOGRAM 7/20/2019 GLAUCOMA SCREENING Q2Y 11/21/2019 DTaP/Tdap/Td series (2 - Td) 9/15/2027 Allergies as of 6/15/2018  Review Complete On: 6/15/2018 By: Traci Vu NP Severity Noted Reaction Type Reactions Nitrate Analogues  03/03/2014   Intolerance Other (comments) Severe headache Bactrim [Sulfamethoprim] Low 12/12/2016   Topical Rash Current Immunizations  Reviewed on 9/18/2017 Name Date Influenza High Dose Vaccine PF 9/15/2017, 9/28/2016 Influenza Vaccine 10/14/2015, 10/4/2013 Pneumococcal Conjugate (PCV-13) 6/8/2017 Pneumococcal Polysaccharide (PPSV-23) 3/4/2014 10:40 AM  
 Tdap 9/15/2017 Not reviewed this visit You Were Diagnosed With   
  
 Codes Comments Insect bite, subsequent encounter    -  Primary ICD-10-CM: W57. Laura Ranks ICD-9-CM: V58.89, 919.4 Rash of face     ICD-10-CM: R21 
ICD-9-CM: 782.1 Vitals BP Pulse Resp Height(growth percentile) Weight(growth percentile) SpO2  
 145/80 (BP 1 Location: Left arm, BP Patient Position: Sitting) 73 19 5' 7\" (1.702 m) 199 lb 12.8 oz (90.6 kg) 98% BMI OB Status Smoking Status 31.29 kg/m2 Menopause Never Smoker Vitals History BMI and BSA Data Body Mass Index Body Surface Area  
 31.29 kg/m 2 2.07 m 2 Preferred Pharmacy Pharmacy Name Phone [facility] [address] [phone] Your Updated Medication List  
  
   
This list is accurate as of 6/15/18  2:23 PM.  Always use your most recent med list. amLODIPine 5 mg tablet Commonly known as:  [place] Heidi TAKE 1 TABLET EVERY DAY  
  
 aspirin 81 mg chewable tablet Take 1 Tab by mouth daily. Over the counter  
  
 carvedilol 25 mg tablet Commonly known as:  Raynette Hy Take 1 Tab by mouth two (2) times daily (with meals). Indications: pressure and heart  
  
 cholecalciferol (VITAMIN D3) 5,000 unit Tab tablet Commonly known as:  VITAMIN D3 Take 1 Tab by mouth daily. irbesartan-hydroCHLOROthiazide 300-12.5 mg per tablet Commonly known as:  AVALIDE  
TAKE 1 TABLET EVERY DAY  FOR  PRESSURE  
  
 levothyroxine 88 mcg tablet Commonly known as:  SYNTHROID Take 1 Tab by mouth Daily (before breakfast). Indications: low thyroid, new higher dose  
  
 methylPREDNISolone acetate 80 mg/mL injection Commonly known as:  DEPO-MEDROL 1 mL by IntraMUSCular route once for 1 dose. nitroglycerin 0.4 mg SL tablet Commonly known as:  NITROSTAT  
1 Tab by SubLINGual route every five (5) minutes as needed for Chest Pain (call 911 if not relieved by 3). omeprazole 40 mg capsule Commonly known as:  PRILOSEC  
TAKE 1 CAPSULE EVERY DAY  
  
 pravastatin 40 mg tablet Commonly known as:  PRAVACHOL Take 1 Tab by mouth nightly. Indications: heart and cholesterol  
  
 predniSONE 10 mg dose pack Commonly known as:  STERAPRED DS See administration instruction per 10mg dose pack Prescriptions Sent to Pharmacy Refills  
 nitroglycerin (NITROSTAT) 0.4 mg SL tablet 1  Si Tab by SubLINGual route every five (5) minutes as needed for Chest Pain (call 911 if not relieved by 3). Class: Normal  
 Pharmacy: [address] Ph #: [phone] Route: SubLINGual  
 predniSONE (STERAPRED DS) 10 mg dose pack 0 Sig: See administration instruction per 10mg dose pack Class: Normal  
 Pharmacy: [address], [address] Ph #: [phone] We Performed the Following METHYLPREDNISOLONE ACETATE INJECTION 80 MG [ [facility]] OK THER/PROPH/DIAG INJECTION, SUBCUT/IM [id number] CPT(R)] To-Do List   
 07/23/2018 9:00 AM  
  Appointment with [address] [place] [address] at [facility] ([phone]) EXAM INSTRUCTIONS Do not wear deodorant, lotion, or powders to your appointment. GENERAL INSTRUCTIONS - Bring a list of all medications you are currently taking, including over the counter medications. - Only patients will be allowed in the exam room. This includes children. - Children under the age of 15 may not be left unattended. - [address] [facility] patients must have an armband and be accompanied by a caregiver or family member. - If you have a hand-written prescription for this exam, you must bring it with you on the day of your exam. - Bring all relevant prior images from any facility outside [facility] with you on the day of your exam.  Failure to provide images may delay reading by Radiologist.  If you have questions or need to reschedule or cancel your appointment, call [phone]. Introducing [facility]! Dear Amy Jeffery: Thank you for requesting a [employer] account. Our records indicate that you already have an active [employer] account. You can access your account anytime at [email or web address]. [email or web address] Did you know that you can access your hospital and ER discharge instructions at any time in [employer]? You can also review all of your test results from your hospital stay or ER visit. Additional Information If you have questions, please visit the Frequently Asked Questions section of the [employer] website at [email or web address] Remember, [employer] Program is NOT to be used for urgent needs. For medical emergencies, dial 911. Now available from your iPhone and Android! Please provide this summary of care documentation to your next provider. Your primary care clinician is listed as Tanya Merino. If you have any questions after today's visit, please call [phone].

## 2018-08-28 ENCOUNTER — OFFICE VISIT (OUTPATIENT)
Dept: FAMILY MEDICINE CLINIC | Age: 73
End: 2018-08-28

## 2018-08-28 VITALS
SYSTOLIC BLOOD PRESSURE: 124 MMHG | RESPIRATION RATE: 14 BRPM | WEIGHT: 188 LBS | OXYGEN SATURATION: 99 % | HEART RATE: 60 BPM | DIASTOLIC BLOOD PRESSURE: 78 MMHG | HEIGHT: 67 IN | BODY MASS INDEX: 29.51 KG/M2 | TEMPERATURE: 98 F

## 2018-08-28 DIAGNOSIS — E03.9 ACQUIRED HYPOTHYROIDISM: ICD-10-CM

## 2018-08-28 DIAGNOSIS — I10 ESSENTIAL HYPERTENSION: ICD-10-CM

## 2018-08-28 DIAGNOSIS — I25.10 CORONARY ARTERY DISEASE INVOLVING NATIVE CORONARY ARTERY OF NATIVE HEART WITHOUT ANGINA PECTORIS: ICD-10-CM

## 2018-08-28 DIAGNOSIS — G43.009 MIGRAINE WITHOUT AURA AND WITHOUT STATUS MIGRAINOSUS, NOT INTRACTABLE: Primary | ICD-10-CM

## 2018-08-28 DIAGNOSIS — J30.1 ALLERGIC RHINITIS DUE TO POLLEN, UNSPECIFIED SEASONALITY: ICD-10-CM

## 2018-08-28 DIAGNOSIS — E78.00 PURE HYPERCHOLESTEROLEMIA: ICD-10-CM

## 2018-08-28 RX ORDER — KETOROLAC TROMETHAMINE 30 MG/ML
30 INJECTION, SOLUTION INTRAMUSCULAR; INTRAVENOUS ONCE
Qty: 1 VIAL | Refills: 0
Start: 2018-08-28 | End: 2018-08-28

## 2018-08-28 RX ORDER — PROMETHAZINE HYDROCHLORIDE 25 MG/ML
25 INJECTION, SOLUTION INTRAMUSCULAR; INTRAVENOUS ONCE
Qty: 1 ML | Refills: 0
Start: 2018-08-28 | End: 2018-08-28

## 2018-08-28 NOTE — PATIENT INSTRUCTIONS
Migraine Headache: Care Instructions Your Care Instructions Migraines are painful, throbbing headaches that often start on one side of the head. They may cause nausea and vomiting and make you sensitive to light, sound, or smell. Without treatment, migraines can last from 4 hours to a few days. Medicines can help prevent migraines or stop them after they have started. Your doctor can help you find which ones work best for you. Follow-up care is a key part of your treatment and safety. Be sure to make and go to all appointments, and call your doctor if you are having problems. It's also a good idea to know your test results and keep a list of the medicines you take. How can you care for yourself at home? · Do not drive if you have taken a prescription pain medicine. · Rest in a quiet, dark room until your headache is gone. Close your eyes, and try to relax or go to sleep. Don't watch TV or read. · Put a cold, moist cloth or cold pack on the painful area for 10 to 20 minutes at a time. Put a thin cloth between the cold pack and your skin. · Use a warm, moist towel or a heating pad set on low to relax tight shoulder and neck muscles. · Have someone gently massage your neck and shoulders. · Take your medicines exactly as prescribed. Call your doctor if you think you are having a problem with your medicine. You will get more details on the specific medicines your doctor prescribes. · Be careful not to take pain medicine more often than the instructions allow. You could get worse or more frequent headaches when the medicine wears off. To prevent migraines · Keep a headache diary so you can figure out what triggers your headaches. Avoiding triggers may help you prevent headaches. Record when each headache began, how long it lasted, and what the pain was like.  (Was it throbbing, aching, stabbing, or dull?) Write down any other symptoms you had with the headache, such as nausea, flashing lights or dark spots, or sensitivity to bright light or loud noise. Note if the headache occurred near your period. List anything that might have triggered the headache. Triggers may include certain foods (chocolate, cheese, wine) or odors, smoke, bright light, stress, or lack of sleep. · If your doctor has prescribed medicine for your migraines, take it as directed. You may have medicine that you take only when you get a migraine and medicine that you take all the time to help prevent migraines. ¨ If your doctor has prescribed medicine for when you get a headache, take it at the first sign of a migraine, unless your doctor has given you other instructions. ¨ If your doctor has prescribed medicine to prevent migraines, take it exactly as prescribed. Call your doctor if you think you are having a problem with your medicine. · Find healthy ways to deal with stress. Migraines are most common during or right after stressful times. Take time to relax before and after you do something that has caused a migraine in the past. 
· Try to keep your muscles relaxed by keeping good posture. Check your jaw, face, neck, and shoulder muscles for tension. Try to relax them. When you sit at a desk, change positions often. And make sure to stretch for 30 seconds each hour. · Get plenty of sleep and exercise. · Eat meals on a regular schedule. Avoid foods and drinks that often trigger migraines. These include chocolate, alcohol (especially red wine and port), aspartame, monosodium glutamate (MSG), and some additives found in foods (such as hot dogs, [place], cold cuts, aged cheeses, and pickled foods). · Limit caffeine. Don't drink too much coffee, tea, or soda. But don't quit caffeine suddenly. That can also give you migraines. · Do not smoke or allow others to smoke around you. If you need help quitting, talk to your doctor about stop-smoking programs and medicines. These can increase your chances of quitting for good. · If you are taking birth control pills or hormone therapy, talk to your doctor about whether they are triggering your migraines. When should you call for help? Call 911 anytime you think you may need emergency care. For example, call if: 
  · You have signs of a stroke. These may include: 
¨ Sudden numbness, paralysis, or weakness in your face, arm, or leg, especially on only one side of your body. ¨ Sudden vision changes. ¨ Sudden trouble speaking. ¨ Sudden confusion or trouble understanding simple statements. ¨ Sudden problems with walking or balance. ¨ A sudden, severe headache that is different from past headaches.  
 Call your doctor now or seek immediate medical care if: 
  · You have new or worse nausea and vomiting.  
  · You have a new or higher fever.  
  · Your headache gets much worse.  
 Watch closely for changes in your health, and be sure to contact your doctor if: 
  · You are not getting better after 2 days (48 hours). Where can you learn more? Go to http://dez-marquez.info/. Enter T180 in the search box to learn more about \"Migraine Headache: Care Instructions. \" Current as of: October 9, 2017 Content Version: 11.7 © 1185-6759 [employer]. Care instructions adapted under license by [employer] (which disclaims liability or warranty for this information). If you have questions about a medical condition or this instruction, always ask your healthcare professional. [address] any warranty or liability for your use of this information.

## 2018-08-28 NOTE — PROGRESS NOTES
Zackary Goodell is a 72 y.o. female presenting for/with: 
 
Headache (times 4 days) HPI: 
HA's Pt reports she's been having a R temporal HA for past 4 days. Pounding. Has hx migraine in past. Taking motrin, no help, APAP, no help. Better with dark environment. Worse with noise. Mild nausea with this. No known triggers. Follow- up for hypothyroidism. Current dose of levothyroxine supposed to be 88mcg, pt not sure if that's what she's getting though. Current symptoms: none. TSH off last visit. Lab Results Component Value Date/Time TSH 4.790 (H) 06/08/2018 10:28 AM  
 TSH 2.190 05/16/2016 09:00 AM  
 
Hyperlipidemia. Back on pravastatin, taking 40mg daily. alesha well, no further rash. Lab Results Component Value Date/Time Cholesterol, total 178 06/08/2018 10:28 AM  
 HDL Cholesterol 59 06/08/2018 10:28 AM  
 LDL, calculated 97 06/08/2018 10:28 AM  
 VLDL, calculated 22 06/08/2018 10:28 AM  
 Triglyceride 109 06/08/2018 10:28 AM  
 CHOL/HDL Ratio 5.0 03/03/2014 04:54 AM  
 
Lab Results Component Value Date/Time ALT (SGPT) 14 06/08/2018 10:28 AM  
 AST (SGOT) 17 06/08/2018 10:28 AM  
 Alk. phosphatase 68 06/08/2018 10:28 AM  
 Bilirubin, direct 0.11 09/28/2016 09:41 AM  
 Bilirubin, total 0.5 06/08/2018 10:28 AM  
 
PMH, SH, Medications/Allergies: reviewed, on chart. ROS: 
Constitutional: No fever, chills or abn weight loss- has been cutting back on her portions. Respiratory: No cough, SOB  
CV: No chest pain or Palpitations Exam: 
Visit Vitals  /78 (BP 1 Location: Right arm, BP Patient Position: Sitting)  Pulse 60  Temp 98 °F (36.7 °C) (Temporal)  Resp 14  
 Ht 5' 7\" (1.702 m)  Wt 188 lb (85.3 kg)  SpO2 99%  BMI 29.44 kg/m2 Wt Readings from Last 3 Encounters:  
08/28/18 188 lb (85.3 kg) 06/15/18 199 lb 12.8 oz (90.6 kg) 06/08/18 197 lb (89.4 kg)  
-11# 
BP Readings from Last 3 Encounters:  
08/28/18 124/78  
06/15/18 145/80  
06/08/18 138/70 Physical Examination: General appearance - alert, well appearing, and in no distress Mental status - alert, oriented to person, place, and time Eyes - pupils equal and reactive, extraocular eye movements intact ENT - External ears normal, Ext nose normal. Normal lips. OP pink, moist. 
Neck - supple, no significant adenopathy, no thyromegaly or mass. No erythema to upper neck and chest, no eruption to upper neck, cheeks. A/P: 
HA 
C/w migraine. tx with toradol 30mg IM and phenergan 25mg IM. Use warm compresses to sinuses, sinus rinse, and claritin prn allergies. Eczema In remission lately. Just using moisturizer now. Has diprolene for PRN use now. Seeing Dr. Argenis Chacko for that in [place] off of [address]. Off periactin. Next visit August. 
 
HTN Doing well. con't  Coreg, avalide, norvasc, ASA at current doses. Lipids and CHD In goal back on pravastatin 40 qd, alesha well. Recheck labs Hypothyroid Not in goal, needs the 88mcg dose. Pt to check on that. Plan recheck labs this fall Hx chronic pancreatitis No sx in a long time. Lipase ok. retire dx. F/U 6mo/PRN.

## 2018-08-28 NOTE — MR AVS SNAPSHOT
[address] [facility], [place], operated by [facility] 
 
 
 [address] [id number] [facility],[address] [address] [phone] Patient: Samanta Martínez MRN: [id number] CESAR:[birth year] Visit Information Date & Time Provider Department Dept. Phone Encounter #  
 8/28/2018 10:[place] [address] [place] [address] [id number] Follow-up Instructions Return in about 6 months (around 2/28/2019), or if symptoms worsen or fail to improve. Your Appointments 12/10/2018 10:10 AM  
ESTABL[facility] Appt Note: 6 mo f/u  
 [address] [address] [phone]  
  
   
 [address] [address] Upcoming Health Maintenance Date Due Influenza Age 5 to Adult 9/1/2018* COLONOSCOPY 4/30/2019 MEDICARE YEARLY EXAM 6/9/2019 GLAUCOMA SCREENING Q2Y 11/21/2019 BREAST CANCER SCRN MAMMOGRAM 7/23/2020 DTaP/Tdap/Td series (2 - Td) 9/15/2027 *Topic was postponed. The date shown is not the original due date. Allergies as of 8/28/2018  Review Complete On: 8/28/2018 By: Jay Bills LPN Severity Noted Reaction Type Reactions Nitrate Analogues  03/03/2014   Intolerance Other (comments) Severe headache Bactrim [Sulfamethoprim] Low 12/12/2016   Topical Rash Current Immunizations  Reviewed on 9/18/2017 Name Date Influenza High Dose Vaccine PF 9/15/2017, 9/28/2016 Influenza Vaccine 10/14/2015, 10/4/2013 Pneumococcal Conjugate (PCV-13) 6/8/2017 Pneumococcal Polysaccharide (PPSV-23) 3/4/2014 10:40 AM  
 Tdap 9/15/2017 Not reviewed this visit You Were Diagnosed With   
  
 Codes Comments Migraine without aura and without status migrainosus, not intractable    -  Primary ICD-10-CM: G43.009 ICD-9-CM: 346.10  Coronary artery disease involving native coronary artery of native heart without angina pectoris     ICD-10-CM: I25.10 ICD-9-CM: 414.01 Acquired hypothyroidism     ICD-10-CM: E03.9 ICD-9-CM: 244.9 Pure hypercholesterolemia     ICD-10-CM: E78.00 ICD-9-CM: 272.0 Essential hypertension     ICD-10-CM: I10 
ICD-9-CM: 401.9 Allergic rhinitis due to pollen, unspecified seasonality     ICD-10-CM: J30.1 ICD-9-CM: 477.0 Vitals BP Pulse Temp Resp Height(growth percentile) Weight(growth percentile) 124/78 (BP 1 Location: Right arm, BP Patient Position: Sitting) 60 98 °F (36.7 °C) (Temporal) 14 5' 7\" (1.702 m) 188 lb (85.3 kg) SpO2 BMI OB Status Smoking Status 99% 29.44 kg/m2 Menopause Never Smoker BMI and BSA Data Body Mass Index Body Surface Area  
 29.44 kg/m 2 2.01 m 2 Preferred Pharmacy Pharmacy Name Phone [address] [facility] [phone] Your Updated Medication List  
  
   
This list is accurate as of 8/28/18 11:18 AM.  Always use your most recent med list. amLODIPine 5 mg tablet Commonly known as:  Kim Nyla TAKE 1 TABLET EVERY DAY  
  
 aspirin 81 mg chewable tablet Take 1 Tab by mouth daily. Over the counter  
  
 carvedilol 25 mg tablet Commonly known as:  Collin Gut Take 1 Tab by mouth two (2) times daily (with meals). Indications: pressure and heart  
  
 cholecalciferol (VITAMIN D3) 5,000 unit Tab tablet Commonly known as:  VITAMIN D3 Take 1 Tab by mouth daily. irbesartan-hydroCHLOROthiazide 300-12.5 mg per tablet Commonly known as:  AVALIDE  
TAKE 1 TABLET EVERY DAY  FOR  PRESSURE  
  
 ketorolac 30 mg/mL (1 mL) injection Commonly known as:  TORADOL  
1 mL by IntraMUSCular route once for 1 dose. levothyroxine 88 mcg tablet Commonly known as:  SYNTHROID Take 1 Tab by mouth Daily (before breakfast). Indications: low thyroid, new higher dose  
  
 nitroglycerin 0.4 mg SL tablet Commonly known as:  NITROSTAT 1 Tab by SubLINGual route every five (5) minutes as needed for Chest Pain (call 911 if not relieved by 3). omeprazole 40 mg capsule Commonly known as:  PRILOSEC  
TAKE 1 CAPSULE EVERY DAY  
  
 pravastatin 40 mg tablet Commonly known as:  PRAVACHOL Take 1 Tab by mouth nightly. Indications: heart and cholesterol  
  
 promethazine 25 mg/mL injection Commonly known as:  PHENERGAN  
1 mL by IntraMUSCular route once for 1 dose. We Performed the Following KETOROLAC TROMETHAMINE INJ [ [facility]] AR THER/PROPH/DIAG INJECTION, SUBCUT/IM [id number] CPT(R)] AR THER/PROPH/DIAG INJECTION, SUBCUT/IM [id number] CPT(R)] PROMETHAZINE HCL INJECTION [ [facility]] Follow-up Instructions Return in about 6 months (around 2/28/2019), or if symptoms worsen or fail to improve. Patient Instructions Migraine Headache: Care Instructions Your Care Instructions Migraines are painful, throbbing headaches that often start on one side of the head. They may cause nausea and vomiting and make you sensitive to light, sound, or smell. Without treatment, migraines can last from 4 hours to a few days. Medicines can help prevent migraines or stop them after they have started. Your doctor can help you find which ones work best for you. Follow-up care is a key part of your treatment and safety. Be sure to make and go to all appointments, and call your doctor if you are having problems. It's also a good idea to know your test results and keep a list of the medicines you take. How can you care for yourself at home? · Do not drive if you have taken a prescription pain medicine. · Rest in a quiet, dark room until your headache is gone. Close your eyes, and try to relax or go to sleep. Don't watch TV or read. · Put a cold, moist cloth or cold pack on the painful area for 10 to 20 minutes at a time. Put a thin cloth between the cold pack and your skin. · Use a warm, moist towel or a heating pad set on low to relax tight shoulder and neck muscles. · Have someone gently massage your neck and shoulders. · Take your medicines exactly as prescribed. Call your doctor if you think you are having a problem with your medicine. You will get more details on the specific medicines your doctor prescribes. · Be careful not to take pain medicine more often than the instructions allow. You could get worse or more frequent headaches when the medicine wears off. To prevent migraines · Keep a headache diary so you can figure out what triggers your headaches. Avoiding triggers may help you prevent headaches. Record when each headache began, how long it lasted, and what the pain was like. (Was it throbbing, aching, stabbing, or dull?) Write down any other symptoms you had with the headache, such as nausea, flashing lights or dark spots, or sensitivity to bright light or loud noise. Note if the headache occurred near your period. List anything that might have triggered the headache. Triggers may include certain foods (chocolate, cheese, wine) or odors, smoke, bright light, stress, or lack of sleep. · If your doctor has prescribed medicine for your migraines, take it as directed. You may have medicine that you take only when you get a migraine and medicine that you take all the time to help prevent migraines. ¨ If your doctor has prescribed medicine for when you get a headache, take it at the first sign of a migraine, unless your doctor has given you other instructions. ¨ If your doctor has prescribed medicine to prevent migraines, take it exactly as prescribed. Call your doctor if you think you are having a problem with your medicine. · Find healthy ways to deal with stress. Migraines are most common during or right after stressful times.  Take time to relax before and after you do something that has caused a migraine in the past. 
 · Try to keep your muscles relaxed by keeping good posture. Check your jaw, face, neck, and shoulder muscles for tension. Try to relax them. When you sit at a desk, change positions often. And make sure to stretch for 30 seconds each hour. · Get plenty of sleep and exercise. · Eat meals on a regular schedule. Avoid foods and drinks that often trigger migraines. These include chocolate, alcohol (especially red wine and port), aspartame, monosodium glutamate (MSG), and some additives found in foods (such as hot dogs, [place], cold cuts, aged cheeses, and pickled foods). · Limit caffeine. Don't drink too much coffee, tea, or soda. But don't quit caffeine suddenly. That can also give you migraines. · Do not smoke or allow others to smoke around you. If you need help quitting, talk to your doctor about stop-smoking programs and medicines. These can increase your chances of quitting for good. · If you are taking birth control pills or hormone therapy, talk to your doctor about whether they are triggering your migraines. When should you call for help? Call 911 anytime you think you may need emergency care. For example, call if: 
  · You have signs of a stroke. These may include: 
¨ Sudden numbness, paralysis, or weakness in your face, arm, or leg, especially on only one side of your body. ¨ Sudden vision changes. ¨ Sudden trouble speaking. ¨ Sudden confusion or trouble understanding simple statements. ¨ Sudden problems with walking or balance. ¨ A sudden, severe headache that is different from past headaches.  
 Call your doctor now or seek immediate medical care if: 
  · You have new or worse nausea and vomiting.  
  · You have a new or higher fever.  
  · Your headache gets much worse.  
 Watch closely for changes in your health, and be sure to contact your doctor if: 
  · You are not getting better after 2 days (48 hours). Where can you learn more? Go to http://dez-marquez.info/. Enter O412 in the search box to learn more about \"Migraine Headache: Care Instructions. \" Current as of: October 9, 2017 Content Version: 11.7 © 4537-7768 [employer]. Care instructions adapted under license by [employer] (which disclaims liability or warranty for this information). If you have questions about a medical condition or this instruction, always ask your healthcare professional. [address] any warranty or liability for your use of this information. Introducing [facility]! Dear Saray Campos: Thank you for requesting a [facility] account. Our records indicate that you already have an active [facility] account. You can access your account anytime at [email or web address] Did you know that you can access your hospital and ER discharge instructions at any time in [facility]? You can also review all of your test results from your hospital stay or ER visit. Additional Information If you have questions, please visit the Frequently Asked Questions section of the [facility] website at [email or web address] Remember, [facility] is NOT to be used for urgent needs. For medical emergencies, dial 911. Now available from your iPhone and Android! Please provide this summary of care documentation to your next provider. Your primary care clinician is listed as Rico Merino. If you have any questions after today's visit, please call [phone].

## 2018-08-28 NOTE — PROGRESS NOTES
1. Have you been to the ER, urgent care clinic since your last visit? Hospitalized since your last visit? No 
 
2. Have you seen or consulted any other health care providers outside of the [facility] since your last visit? Include any pap smears or colon screening.  No

## 2019-02-28 PROBLEM — J01.00 ACUTE NON-RECURRENT MAXILLARY SINUSITIS: Status: ACTIVE | Noted: 2019-02-28

## 2019-04-28 PROBLEM — T14.8XXA SPRAIN AND STRAIN: Status: ACTIVE | Noted: 2019-04-28

## 2019-04-28 PROBLEM — H65.91 RIGHT NON-SUPPURATIVE OTITIS MEDIA: Status: ACTIVE | Noted: 2019-04-28

## 2019-05-21 ENCOUNTER — HOSPITAL ENCOUNTER (EMERGENCY)
Age: 74
Discharge: HOME | End: 2019-05-22
Attending: EMERGENCY MEDICINE
Payer: MEDICARE

## 2019-05-21 DIAGNOSIS — K57.32 DIVERTICULITIS OF LARGE INTESTINE WITHOUT PERFORATION OR ABSCESS WITHOUT BLEEDING: Primary | ICD-10-CM

## 2019-05-21 LAB
ALBUMIN SERPL-MCNC: 3.6 G/DL (ref 3.5–5)
ALBUMIN/GLOB SERPL: 0.9 {RATIO} (ref 1.1–2.2)
ALP SERPL-CCNC: 76 U/L (ref 45–117)
ALT SERPL-CCNC: 14 U/L (ref 12–78)
ANION GAP SERPL CALC-SCNC: 7 MMOL/L (ref 5–15)
APPEARANCE UR: CLEAR
AST SERPL-CCNC: 13 U/L (ref 15–37)
BACTERIA URNS QL MICRO: NEGATIVE /HPF
BASOPHILS # BLD: 0.1 K/UL (ref 0–0.1)
BASOPHILS NFR BLD: 1 % (ref 0–1)
BILIRUB SERPL-MCNC: 0.3 MG/DL (ref 0.2–1)
BILIRUB UR QL: NEGATIVE
BUN SERPL-MCNC: 22 MG/DL (ref 6–20)
BUN/CREAT SERPL: 17 (ref 12–20)
CALCIUM SERPL-MCNC: 9.2 MG/DL (ref 8.5–10.1)
CHLORIDE SERPL-SCNC: 100 MMOL/L (ref 97–108)
CO2 SERPL-SCNC: 29 MMOL/L (ref 21–32)
COLOR UR: ABNORMAL
CREAT SERPL-MCNC: 1.27 MG/DL (ref 0.55–1.02)
DIFFERENTIAL METHOD BLD: ABNORMAL
EOSINOPHIL # BLD: 0.6 K/UL (ref 0–0.4)
EOSINOPHIL NFR BLD: 7 % (ref 0–7)
EPITH CASTS URNS QL MICRO: ABNORMAL /LPF
ERYTHROCYTE [DISTWIDTH] IN BLOOD BY AUTOMATED COUNT: 13.7 % (ref 11.5–14.5)
GLOBULIN SER CALC-MCNC: 4.2 G/DL (ref 2–4)
GLUCOSE SERPL-MCNC: 108 MG/DL (ref 65–100)
GLUCOSE UR STRIP.AUTO-MCNC: NEGATIVE MG/DL
HCT VFR BLD AUTO: 36.5 % (ref 35–47)
HGB BLD-MCNC: 12 G/DL (ref 11.5–16)
HGB UR QL STRIP: NEGATIVE
HYALINE CASTS URNS QL MICRO: ABNORMAL /LPF (ref 0–5)
IMM GRANULOCYTES # BLD AUTO: 0 K/UL (ref 0–0.04)
IMM GRANULOCYTES NFR BLD AUTO: 0 % (ref 0–0.5)
KETONES UR QL STRIP.AUTO: NEGATIVE MG/DL
LEUKOCYTE ESTERASE UR QL STRIP.AUTO: ABNORMAL
LIPASE SERPL-CCNC: 245 U/L (ref 73–393)
LYMPHOCYTES # BLD: 1.6 K/UL (ref 0.8–3.5)
LYMPHOCYTES NFR BLD: 19 % (ref 12–49)
MCH RBC QN AUTO: 29.7 PG (ref 26–34)
MCHC RBC AUTO-ENTMCNC: 32.9 G/DL (ref 30–36.5)
MCV RBC AUTO: 90.3 FL (ref 80–99)
MONOCYTES # BLD: 0.8 K/UL (ref 0–1)
MONOCYTES NFR BLD: 10 % (ref 5–13)
NEUTS SEG # BLD: 5.2 K/UL (ref 1.8–8)
NEUTS SEG NFR BLD: 63 % (ref 32–75)
NITRITE UR QL STRIP.AUTO: NEGATIVE
NRBC # BLD: 0 K/UL (ref 0–0.01)
NRBC BLD-RTO: 0 PER 100 WBC
PH UR STRIP: 5.5 [PH] (ref 5–8)
PLATELET # BLD AUTO: 254 K/UL (ref 150–400)
PMV BLD AUTO: 11.3 FL (ref 8.9–12.9)
POTASSIUM SERPL-SCNC: 4 MMOL/L (ref 3.5–5.1)
PROT SERPL-MCNC: 7.8 G/DL (ref 6.4–8.2)
PROT UR STRIP-MCNC: NEGATIVE MG/DL
RBC # BLD AUTO: 4.04 M/UL (ref 3.8–5.2)
RBC #/AREA URNS HPF: ABNORMAL /HPF (ref 0–5)
SODIUM SERPL-SCNC: 136 MMOL/L (ref 136–145)
SP GR UR REFRACTOMETRY: 1.01 (ref 1–1.03)
UA: UC IF INDICATED,UAUC: ABNORMAL
UROBILINOGEN UR QL STRIP.AUTO: 0.2 EU/DL (ref 0.2–1)
WBC # BLD AUTO: 8.2 K/UL (ref 3.6–11)
WBC URNS QL MICRO: ABNORMAL /HPF (ref 0–4)

## 2019-05-21 PROCEDURE — 87086 URINE CULTURE/COLONY COUNT: CPT

## 2019-05-21 PROCEDURE — 80053 COMPREHEN METABOLIC PANEL: CPT

## 2019-05-21 PROCEDURE — 36415 COLL VENOUS BLD VENIPUNCTURE: CPT

## 2019-05-21 PROCEDURE — 85025 COMPLETE CBC W/AUTO DIFF WBC: CPT

## 2019-05-21 PROCEDURE — 83690 ASSAY OF LIPASE: CPT

## 2019-05-21 PROCEDURE — 81001 URINALYSIS AUTO W/SCOPE: CPT

## 2019-05-21 RX ORDER — SODIUM CHLORIDE 0.9 % (FLUSH) 0.9 %
5-40 SYRINGE (ML) INJECTION AS NEEDED
Status: DISCONTINUED | OUTPATIENT
Start: 2019-05-21 | End: 2019-05-22 | Stop reason: HOSPADM

## 2019-05-21 RX ORDER — SODIUM CHLORIDE 0.9 % (FLUSH) 0.9 %
5-40 SYRINGE (ML) INJECTION EVERY 8 HOURS
Status: DISCONTINUED | OUTPATIENT
Start: 2019-05-21 | End: 2019-05-22 | Stop reason: HOSPADM

## 2019-05-21 RX ADMIN — Medication 10 ML: at 22:00

## 2019-05-22 ENCOUNTER — APPOINTMENT (OUTPATIENT)
Dept: CT IMAGING | Age: 74
End: 2019-05-22
Attending: EMERGENCY MEDICINE
Payer: MEDICARE

## 2019-05-22 VITALS
BODY MASS INDEX: 30.31 KG/M2 | OXYGEN SATURATION: 96 % | WEIGHT: 193.12 LBS | RESPIRATION RATE: 16 BRPM | DIASTOLIC BLOOD PRESSURE: 75 MMHG | HEIGHT: 67 IN | TEMPERATURE: 98.3 F | HEART RATE: 69 BPM | SYSTOLIC BLOOD PRESSURE: 117 MMHG

## 2019-05-22 PROCEDURE — 96365 THER/PROPH/DIAG IV INF INIT: CPT

## 2019-05-22 PROCEDURE — 96361 HYDRATE IV INFUSION ADD-ON: CPT

## 2019-05-22 PROCEDURE — 74011636320 HC RX REV CODE- 636/320: Performed by: EMERGENCY MEDICINE

## 2019-05-22 PROCEDURE — 99284 EMERGENCY DEPT VISIT MOD MDM: CPT

## 2019-05-22 PROCEDURE — 96375 TX/PRO/DX INJ NEW DRUG ADDON: CPT

## 2019-05-22 PROCEDURE — 74011250636 HC RX REV CODE- 250/636: Performed by: EMERGENCY MEDICINE

## 2019-05-22 PROCEDURE — 74177 CT ABD & PELVIS W/CONTRAST: CPT

## 2019-05-22 PROCEDURE — 74011250637 HC RX REV CODE- 250/637: Performed by: EMERGENCY MEDICINE

## 2019-05-22 RX ORDER — HYDROCODONE BITARTRATE AND ACETAMINOPHEN 5; 325 MG/1; MG/1
1 TABLET ORAL
Qty: 10 TAB | Refills: 0 | Status: SHIPPED | OUTPATIENT
Start: 2019-05-22 | End: 2019-05-25

## 2019-05-22 RX ORDER — CIPROFLOXACIN 500 MG/1
500 TABLET ORAL 2 TIMES DAILY
Qty: 20 TAB | Refills: 0 | Status: SHIPPED | OUTPATIENT
Start: 2019-05-22 | End: 2019-06-01

## 2019-05-22 RX ORDER — METRONIDAZOLE 500 MG/100ML
500 INJECTION, SOLUTION INTRAVENOUS
Status: COMPLETED | OUTPATIENT
Start: 2019-05-22 | End: 2019-05-22

## 2019-05-22 RX ORDER — SODIUM CHLORIDE 0.9 % (FLUSH) 0.9 %
10 SYRINGE (ML) INJECTION
Status: COMPLETED | OUTPATIENT
Start: 2019-05-22 | End: 2019-05-22

## 2019-05-22 RX ORDER — HYDROCODONE BITARTRATE AND ACETAMINOPHEN 5; 325 MG/1; MG/1
1 TABLET ORAL ONCE
Status: COMPLETED | OUTPATIENT
Start: 2019-05-22 | End: 2019-05-22

## 2019-05-22 RX ORDER — ONDANSETRON 4 MG/1
4 TABLET, ORALLY DISINTEGRATING ORAL
Qty: 6 TAB | Refills: 0 | Status: SHIPPED | OUTPATIENT
Start: 2019-05-22 | End: 2019-09-06 | Stop reason: ALTCHOICE

## 2019-05-22 RX ORDER — CIPROFLOXACIN 500 MG/1
500 TABLET ORAL
Status: COMPLETED | OUTPATIENT
Start: 2019-05-22 | End: 2019-05-22

## 2019-05-22 RX ORDER — ONDANSETRON 2 MG/ML
8 INJECTION INTRAMUSCULAR; INTRAVENOUS
Status: COMPLETED | OUTPATIENT
Start: 2019-05-22 | End: 2019-05-22

## 2019-05-22 RX ORDER — METRONIDAZOLE 500 MG/1
500 TABLET ORAL 3 TIMES DAILY
Qty: 30 TAB | Refills: 0 | Status: SHIPPED | OUTPATIENT
Start: 2019-05-22 | End: 2019-06-01

## 2019-05-22 RX ADMIN — METRONIDAZOLE 500 MG: 500 INJECTION, SOLUTION INTRAVENOUS at 03:15

## 2019-05-22 RX ADMIN — IOPAMIDOL 100 ML: 755 INJECTION, SOLUTION INTRAVENOUS at 02:20

## 2019-05-22 RX ADMIN — Medication 10 ML: at 02:19

## 2019-05-22 RX ADMIN — SODIUM CHLORIDE 1000 ML: 900 INJECTION, SOLUTION INTRAVENOUS at 00:28

## 2019-05-22 RX ADMIN — CIPROFLOXACIN HYDROCHLORIDE 500 MG: 500 TABLET, FILM COATED ORAL at 03:14

## 2019-05-22 RX ADMIN — ONDANSETRON 8 MG: 2 INJECTION INTRAMUSCULAR; INTRAVENOUS at 00:25

## 2019-05-22 RX ADMIN — HYDROCODONE BITARTRATE AND ACETAMINOPHEN 1 TABLET: 5; 325 TABLET ORAL at 03:14

## 2019-05-22 RX ADMIN — IOHEXOL 50 ML: 240 INJECTION, SOLUTION INTRATHECAL; INTRAVASCULAR; INTRAVENOUS; ORAL at 00:25

## 2019-05-22 NOTE — ED NOTES
Bedside shift change report given to Gael Dobbs (oncoming nurse) by Jose Damico RN  (offgoing nurse). Report included the following information SBAR, ED Summary, MAR and Recent Results.

## 2019-05-22 NOTE — ED PROVIDER NOTES
EMERGENCY DEPARTMENT HISTORY AND PHYSICAL EXAM          Date: 5/21/2019  Patient Name: Huber Nolan    History of Presenting Illness     Chief Complaint   Patient presents with    Abdominal Pain     lower Abd pain; denies n/v/d or any urinary sx's; reports that she has been seen twice over the last 2 weeks for the same and dx'd with \"spasms\"       History Provided By: Patient    HPI: Huber Nolan is a 69 y.o. female, pmhx anxiety and depression, chronic pancreatitis, HTN, IBS CAD s/p cath, who presents ambulatory to the ED c/o AP. Patient states she started with intermittent abdominal pain since this morning. She took some OTC bentyl prescribed by her PCP which eased the pain and then it came back stronger. The pain is currently rated at an 8/10 but she states it has been much worse previously. She denies any nausea, vomiting, fevers, chills, constipation and diarrhea. Her last BM was this AM before the pain and she has had minimal flatulence today. She last took bentyl this evening at 6pm without resolution of her sx. She admits to prior surgeries appendectomy and tubal ligation and feels bloated. Patient specifically denies any recent fevers, chills, nausea, vomiting, diarrhea, abd pain, CP, SOB, urinary sxs, changes in BM, or headache. PCP: Katherine Finley DO    Allergies: Bactrim  Social Hx: -tobacco, -EtOH, -Illicit Drugs    There are no other complaints, changes, or physical findings at this time.      Current Facility-Administered Medications   Medication Dose Route Frequency Provider Last Rate Last Dose    sodium chloride (NS) flush 5-40 mL  5-40 mL IntraVENous Q8H Clyde PURCELL MD   10 mL at 05/21/19 2200    sodium chloride (NS) flush 5-40 mL  5-40 mL IntraVENous PRN Clyde Gaytan MD         Current Outpatient Medications   Medication Sig Dispense Refill    HYDROcodone-acetaminophen (NORCO) 5-325 mg per tablet Take 1 Tab by mouth every four (4) hours as needed for Pain for up to 3 days. Max Daily Amount: 6 Tabs. 10 Tab 0    ondansetron (ZOFRAN ODT) 4 mg disintegrating tablet Take 1 Tab by mouth every eight (8) hours as needed for Nausea. 6 Tab 0    metroNIDAZOLE (FLAGYL) 500 mg tablet Take 1 Tab by mouth three (3) times daily for 10 days. 30 Tab 0    ciprofloxacin HCl (CIPRO) 500 mg tablet Take 1 Tab by mouth two (2) times a day for 10 days. 20 Tab 0    dicyclomine (BENTYL) 10 mg capsule Take 1 Cap by mouth three (3) times daily. Indications: irritable colon, diverticulosis 90 Cap 5    levocetirizine (XYZAL) 5 mg tablet Take 5 mg by mouth daily.  aspirin 81 mg chewable tablet Take 1 Tab by mouth daily. 100 Tab 3    amLODIPine (NORVASC) 5 mg tablet TAKE 1 TABLET EVERY DAY for pressure and heart 90 Tab 3    irbesartan-hydroCHLOROthiazide (AVALIDE) 300-12.5 mg per tablet TAKE 1 TABLET EVERY DAY FOR PRESSURE and heart 90 Tab 3    omeprazole (PRILOSEC) 40 mg capsule TAKE 1 CAPSULE EVERY DAY for stomach 90 Cap 3    nitroglycerin (NITROSTAT) 0.4 mg SL tablet 1 Tab by SubLINGual route every five (5) minutes as needed for Chest Pain (call 911 if not relieved by 3). 25 Tab 1    levothyroxine (SYNTHROID) 88 mcg tablet Take 1 Tab by mouth Daily (before breakfast). Indications: low thyroid, new higher dose 90 Tab 3    carvedilol (COREG) 25 mg tablet Take 1 Tab by mouth two (2) times daily (with meals). Indications: pressure and heart 180 Tab 3    pravastatin (PRAVACHOL) 40 mg tablet Take 1 Tab by mouth nightly. Indications: heart and cholesterol 90 Tab 3    cholecalciferol, VITAMIN D3, (VITAMIN D3) 5,000 unit tab tablet Take 1 Tab by mouth daily. 90 Tab 3       Past History     Past Medical History:  Past Medical History:   Diagnosis Date    Anxiety and depression     Chronic headaches .     vascular    Chronic pancreatitis ([facility] [address] 12/24/2013    Depression     Duodenal ulcer     GERD (gastroesophageal reflux disease)     Hypertension     IBS (irritable bowel syndrome)     Menopause     Onychomycosis     Pancreatitis     Recurrent sinusitis     S/P cardiac cath 3/4/2014    3/3/14 1st diagonal: 60 %  proximal -- Mid RCA: There was a 55 % stenosis.  Sciatica     reccurrent       Past Surgical History:  Past Surgical History:   Procedure Laterality Date    ABDOMEN SURGERY PROC UNLISTED      HX APPENDECTOMY      HX CHOLECYSTECTOMY         Family History:  Family History   Problem Relation Age of Onset    Heart Disease Mother     Heart Disease Father        Social History:  Social History     Tobacco Use    Smoking status: Never Smoker    Smokeless tobacco: Never Used   Substance Use Topics    Alcohol use: No     Alcohol/week: 0.0 oz     Frequency: Never     Drinks per session: Patient refused     Binge frequency: Never    Drug use: No       Allergies: Allergies   Allergen Reactions    Nitrate Analogues Other (comments)     Severe headache    Sulfasalazine Hives    Bactrim [Sulfamethoprim] Rash         Review of Systems   Review of Systems   Constitutional: Negative for activity change, appetite change, chills, fever and unexpected weight change. HENT: Negative for congestion. Eyes: Negative for pain and visual disturbance. Respiratory: Negative for cough and shortness of breath. Cardiovascular: Negative for chest pain. Gastrointestinal: Positive for abdominal distention and abdominal pain. Negative for anal bleeding, blood in stool, constipation, diarrhea, nausea, rectal pain and vomiting. Genitourinary: Negative for decreased urine volume, difficulty urinating, dysuria, flank pain, urgency and vaginal bleeding. Musculoskeletal: Negative for back pain. Skin: Negative for rash. Neurological: Negative for headaches. Physical Exam   Physical Exam   Constitutional: She is oriented to person, place, and time. She appears well-developed and well-nourished.    Elderly female currently in minimal distress   HENT:   Head: Normocephalic and atraumatic. Mouth/Throat: Oropharynx is clear and moist.   Eyes: Pupils are equal, round, and reactive to light. Conjunctivae and EOM are normal. Right eye exhibits no discharge. Left eye exhibits no discharge. Neck: Normal range of motion. Neck supple. Cardiovascular: Normal rate, regular rhythm and normal heart sounds. No murmur heard. Pulmonary/Chest: Effort normal. No respiratory distress. She has no wheezes. She has no rales. Increased BS   Abdominal: Soft. Bowel sounds are normal. She exhibits no distension. There is no tenderness. Musculoskeletal: Normal range of motion. She exhibits no edema. Neurological: She is alert and oriented to person, place, and time. No cranial nerve deficit. She exhibits normal muscle tone. Skin: Skin is warm and dry. No rash noted. She is not diaphoretic. Nursing note and vitals reviewed. Diagnostic Study Results     Labs -     Recent Results (from the past 12 hour(s))   METABOLIC PANEL, COMPREHENSIVE    Collection Time: 05/21/19  9:32 PM   Result Value Ref Range    Sodium 136 136 - 145 mmol/L    Potassium 4.0 3.5 - 5.1 mmol/L    Chloride 100 97 - 108 mmol/L    CO2 29 21 - 32 mmol/L    Anion gap 7 5 - 15 mmol/L    Glucose 108 (H) 65 - 100 mg/dL    BUN 22 (H) 6 - 20 MG/DL    Creatinine 1.27 (H) 0.55 - 1.02 MG/DL    BUN/Creatinine ratio 17 12 - 20      GFR est AA 50 (L) >60 ml/min/1.73m2    GFR est non-AA 41 (L) >60 ml/min/1.73m2    Calcium 9.2 8.5 - 10.1 MG/DL    Bilirubin, total 0.3 0.2 - 1.0 MG/DL    ALT (SGPT) 14 12 - 78 U/L    AST (SGOT) 13 (L) 15 - 37 U/L    Alk.  phosphatase 76 45 - 117 U/L    Protein, total 7.8 6.4 - 8.2 g/dL    Albumin 3.6 3.5 - 5.0 g/dL    Globulin 4.2 (H) 2.0 - 4.0 g/dL    A-G Ratio 0.9 (L) 1.1 - 2.2     CBC WITH AUTOMATED DIFF    Collection Time: 05/21/19  9:32 PM   Result Value Ref Range    WBC 8.2 3.6 - 11.0 K/uL    RBC 4.04 3.80 - 5.20 M/uL    HGB 12.0 11.5 - 16.0 g/dL    HCT 36.5 35.0 - 47.0 %    MCV 90.3 80.0 - 99.0 FL    MCH 29.7 26.0 - 34.0 PG    MCHC 32.9 30.0 - 36.5 g/dL    RDW 13.7 11.5 - 14.5 %    PLATELET 717 954 - 871 K/uL    MPV 11.3 8.9 - 12.9 FL    NRBC 0.0 0  WBC    ABSOLUTE NRBC 0.00 0.00 - 0.01 K/uL    NEUTROPHILS 63 32 - 75 %    LYMPHOCYTES 19 12 - 49 %    MONOCYTES 10 5 - 13 %    EOSINOPHILS 7 0 - 7 %    BASOPHILS 1 0 - 1 %    IMMATURE GRANULOCYTES 0 0.0 - 0.5 %    ABS. NEUTROPHILS 5.2 1.8 - 8.0 K/UL    ABS. LYMPHOCYTES 1.6 0.8 - 3.5 K/UL    ABS. MONOCYTES 0.8 0.0 - 1.0 K/UL    ABS. EOSINOPHILS 0.6 (H) 0.0 - 0.4 K/UL    ABS. BASOPHILS 0.1 0.0 - 0.1 K/UL    ABS. IMM. GRANS. 0.0 0.00 - 0.04 K/UL    DF AUTOMATED     URINALYSIS W/ REFLEX CULTURE    Collection Time: 05/21/19  9:32 PM   Result Value Ref Range    Color YELLOW/STRAW      Appearance CLEAR CLEAR      Specific gravity 1.011 1.003 - 1.030      pH (UA) 5.5 5.0 - 8.0      Protein NEGATIVE  NEG mg/dL    Glucose NEGATIVE  NEG mg/dL    Ketone NEGATIVE  NEG mg/dL    Bilirubin NEGATIVE  NEG      Blood NEGATIVE  NEG      Urobilinogen 0.2 0.2 - 1.0 EU/dL    Nitrites NEGATIVE  NEG      Leukocyte Esterase MODERATE (A) NEG      WBC 5-10 0 - 4 /hpf    RBC 0-5 0 - 5 /hpf    Epithelial cells FEW FEW /lpf    Bacteria NEGATIVE  NEG /hpf    UA:UC IF INDICATED URINE CULTURE ORDERED (A) CNI      Hyaline cast 0-2 0 - 5 /lpf   LIPASE    Collection Time: 05/21/19  9:32 PM   Result Value Ref Range    Lipase 245 73 - 393 U/L       Radiologic Studies -   CT ABD PELV W CONT   Final Result   Acute, uncomplicated, sigmoid diverticulitis; in two separate   diverticula. CT Results  (Last 48 hours)               05/22/19 0219  CT ABD PELV W CONT Final result    Impression:  Acute, uncomplicated, sigmoid diverticulitis; in two separate   diverticula. Narrative:  CT ABDOMEN AND PELVIS WITH CONTRAST. 5/22/2019 2:19 AM        INDICATION: Intermittent abdominal pain. COMPARISON: None.        TECHNIQUE: CT of the abdomen and pelvis was performed after the administration   100 cc IV Isovue-[id number] and oral contrast. CT dose reduction was achieved through   use of a standardized protocol tailored for this examination and automatic   exposure control for dose modulation. FINDINGS:   Abdomen: Post cholecystectomy. Incidental note is made of a right renal cyst and   subcentimeter hypodense renal lesions which are too small to characterize, but   likely also represent cysts. The lung bases are clear. The heart size is normal.   The distal esophagus, stomach, duodenum, liver, pancreas, spleen, and adrenals   are normal.       Pelvis: There are two separate inflamed diverticula in the sigmoid colon, one   arising from the superior medial aspect of the proximal sigmoid colon (601-49),   and a second arising from the inferior aspect of the distal sigmoid colon   (945-92). Descending and sigmoid diverticulosis is mild. The small bowel,   ileocecal junction, proximal colon, and bladder are normal. No free air or   fluid, and no abdominopelvic lymphadenopathy. CXR Results  (Last 48 hours)    None            Medical Decision Making   I am the first provider for this patient. I reviewed the vital signs, available nursing notes, past medical history, past surgical history, family history and social history. Vital Signs-Reviewed the patient's vital signs.   Patient Vitals for the past 12 hrs:   Temp Pulse Resp BP SpO2   05/22/19 0330    117/75 96 %   05/22/19 0230    115/68 97 %   05/22/19 0130    113/58 94 %   05/22/19 0100    113/69 95 %   05/22/19 0030    128/46 96 %   05/21/19 2121 98.3 °F (36.8 °C) 69 16 (!) 149/92 97 %       Pulse Oximetry Analysis - 97% on RA    Cardiac Monitor:   Rate: 69bpm  Rhythm: Normal Sinus Rhythm      Records Reviewed: Nursing Notes, Old Medical Records, Previous electrocardiograms, Previous Radiology Studies and Previous Laboratory Studies   ER visit 5/10  PCP visit 5/17    Provider Notes (Medical Decision Making): MDM: Elderly female with worsening abdominal pain and distension as well as increased BS with a history of abdominal surgeries. ED Course:   Initial assessment performed. The patients presenting problems have been discussed, and they are in agreement with the care plan formulated and outlined with them. I have encouraged them to ask questions as they arise throughout their visit. PROGRESS NOTE:  3 AM  Pt appears more comfortable and is sitting with family. Her vital signs remained stable without concern for bacteremia. Discussed her results of CT and lab analyses and recommend oral dose of medications prior to discharge with prescriptions at home. Discharge note:  3:30 AM  Pt re-evaluated and noted to be feeling better, ready for discharge. Updated pt and family on all final lab and radiology findings. Will follow up as instructed with her primary care physician. All questions have been answered, pt voiced understanding and agreement with plan. Narcotics were prescribed, pt was advised not to drive or operate heavy machinery. Abx were prescribed, pt advised that diarrhea and rash are possible side effects of the medications. Specific return precautions provided as well as instructions to return to the ED should sx worsen at any time. Vital signs stable for discharge. Critical Care Time:   0      Diagnosis     Clinical Impression:   1. Diverticulitis of large intestine without perforation or abscess without bleeding        PLAN:  1. Discharge Medication List as of 5/22/2019  2:58 AM      START taking these medications    Details   HYDROcodone-acetaminophen (NORCO) 5-325 mg per tablet Take 1 Tab by mouth every four (4) hours as needed for Pain for up to 3 days.  Max Daily Amount: 6 Tabs., Print, Disp-10 Tab, R-0      ondansetron (ZOFRAN ODT) 4 mg disintegrating tablet Take 1 Tab by mouth every eight (8) hours as needed for Nausea., Normal, Disp-6 Tab, R-0      metroNIDAZOLE (FLAGYL) 500 mg tablet Take 1 Tab by mouth three (3) times daily for 10 days. , Normal, Disp-30 Tab, R-0      ciprofloxacin HCl (CIPRO) 500 mg tablet Take 1 Tab by mouth two (2) times a day for 10 days. , Normal, Disp-20 Tab, R-0         CONTINUE these medications which have NOT CHANGED    Details   dicyclomine (BENTYL) 10 mg capsule Take 1 Cap by mouth three (3) times daily. Indications: irritable colon, diverticulosis, Normal, Disp-90 Cap, R-5      levocetirizine (XYZAL) 5 mg tablet Take 5 mg by mouth daily. , Historical Med      aspirin 81 mg chewable tablet Take 1 Tab by mouth daily. , Normal, Disp-100 Tab, R-3      amLODIPine (NORVASC) 5 mg tablet TAKE 1 TABLET EVERY DAY for pressure and heart, Normal, Disp-90 Tab, R-3      irbesartan-hydroCHLOROthiazide (AVALIDE) 300-12.5 mg per tablet TAKE 1 TABLET EVERY DAY FOR PRESSURE and heart, Normal, Disp-90 Tab, R-3      omeprazole (PRILOSEC) 40 mg capsule TAKE 1 CAPSULE EVERY DAY for stomach, Normal, Disp-90 Cap, R-3      nitroglycerin (NITROSTAT) 0.4 mg SL tablet 1 Tab by SubLINGual route every five (5) minutes as needed for Chest Pain (call 911 if not relieved by 3). , Normal, Disp-25 Tab, R-1      levothyroxine (SYNTHROID) 88 mcg tablet Take 1 Tab by mouth Daily (before breakfast). Indications: low thyroid, new higher dose, Normal, Disp-90 Tab, R-3      carvedilol (COREG) 25 mg tablet Take 1 Tab by mouth two (2) times daily (with meals). Indications: pressure and heart, Normal, Disp-180 Tab, R-3      pravastatin (PRAVACHOL) 40 mg tablet Take 1 Tab by mouth nightly. Indications: heart and cholesterol, Normal, Disp-90 Tab, R-3      cholecalciferol, VITAMIN D3, (VITAMIN D3) 5,000 unit tab tablet Take 1 Tab by mouth daily. , Mail Order, Disp-90 Tab, R-3           2.    Follow-up Information     Follow up With Specialties Details Why Contact Claudette Elise,  Internal Medicine, Pediatrics Call To schedule a recheck appointment [address] [facility] [id number]  [phone]      hospitals EMERGENCY DEPT Emergency Medicine  If symptoms worsen [address] [place] [address]  [phone]        Return to ED if worse     Disposition:  home      Please note, this dictation was completed with Wong[employer], the computer voice recognition software. Quite often unanticipated grammatical, syntax, homophones, and other interpretive errors are inadvertently transcribed by the computer software. Please disregard these errors. Please excuse any errors that have escaped final proof reading.

## 2019-05-22 NOTE — DISCHARGE INSTRUCTIONS
Patient Education        Diverticulitis: Care Instructions  Your Care Instructions    Diverticulitis occurs when pouches form in the wall of the colon and become inflamed or infected. It can be very painful. Doctors aren't sure what causes diverticulitis. There is no proof that foods such as nuts, seeds, or berries cause it or make it worse. A low-fiber diet may cause the colon to work harder to push stool forward. Pouches may form because of this extra work. It may be hard to think about healthy eating while you're in pain. But as you recover, you might think about how you can use healthy eating for overall better health. Healthy eating may help you avoid future attacks. Follow-up care is a key part of your treatment and safety. Be sure to make and go to all appointments, and call your doctor if you are having problems. It's also a good idea to know your test results and keep a list of the medicines you take. How can you care for yourself at home? · Drink plenty of fluids, enough so that your urine is light yellow or clear like water. If you have kidney, heart, or liver disease and have to limit fluids, talk with your doctor before you increase the amount of fluids you drink. · Stick to liquids or a bland diet (plain rice, bananas, dry toast or crackers, applesauce) until you are feeling better. Then you can return to regular foods and gradually increase the amount of fiber in your diet. · Use a heating pad set on low on your belly to relieve mild cramps and pain. · Get extra rest until you are feeling better. · Be safe with medicines. Read and follow all instructions on the label. ? If the doctor gave you a prescription medicine for pain, take it as prescribed. ? If you are not taking a prescription pain medicine, ask your doctor if you can take an over-the-counter medicine. · If your doctor prescribed antibiotics, take them as directed. Do not stop taking them just because you feel better.  You need to take the full course of antibiotics. To prevent future attacks of diverticulitis  · Avoid constipation:  ? Include fruits, vegetables, beans, and whole grains in your diet each day. These foods are high in fiber. ? Drink plenty of fluids, enough so that your urine is light yellow or clear like water. If you have kidney, heart, or liver disease and have to limit fluids, talk with your doctor before you increase the amount of fluids you drink. ? Get some exercise every day. Build up slowly to 30 to 60 minutes a day on 5 or more days of the week. ? Take a fiber supplement, such as Citrucel or Metamucil, every day if needed. Read and follow all instructions on the label. ? Schedule time each day for a bowel movement. Having a daily routine may help. Take your time and do not strain when having a bowel movement. When should you call for help? Call your doctor now or seek immediate medical care if:    · You have a fever.     · You are vomiting.     · You have new or worse belly pain.     · You cannot pass stools or gas.    Watch closely for changes in your health, and be sure to contact your doctor if you have any problems. Where can you learn more? Go to http://dez-marquez.info/. Enter H901 in the search box to learn more about \"Diverticulitis: Care Instructions. \"  Current as of: March 27, 2018  Content Version: 11.9  © 9448-3628 [employer]. Care instructions adapted under license by [employer] (which disclaims liability or warranty for this information). If you have questions about a medical condition or this instruction, always ask your healthcare professional. [place] [address] any warranty or liability for your use of this information.

## 2019-05-22 NOTE — ED NOTES
Patient presents to the ED with complaint of lower abdominal pain - pt denies any N/V/D, fever, chills or urinary symptoms at this time - Patient was seen 2 times in the past 2 weeks and was diagnosed with Diverticulitis and given Dicyclomine without relief

## 2019-05-22 NOTE — ED NOTES
Patient discharged by Colleen Patel MD. Patient provided with discharge instructions Rx and instructions on follow up care. Patient out of ED via wheelchair accompanied by family.

## 2019-05-23 LAB
BACTERIA SPEC CULT: NORMAL
CC UR VC: NORMAL
SERVICE CMNT-IMP: NORMAL

## 2019-06-16 PROBLEM — M25.511 ACUTE PAIN OF RIGHT SHOULDER: Status: ACTIVE | Noted: 2019-06-16

## 2019-07-28 DIAGNOSIS — E03.4 HYPOTHYROIDISM DUE TO ACQUIRED ATROPHY OF THYROID: ICD-10-CM

## 2019-07-28 DIAGNOSIS — I25.10 CORONARY ARTERY DISEASE INVOLVING NATIVE CORONARY ARTERY OF NATIVE HEART WITHOUT ANGINA PECTORIS: ICD-10-CM

## 2019-07-28 DIAGNOSIS — I10 ESSENTIAL HYPERTENSION WITH GOAL BLOOD PRESSURE LESS THAN 140/90: ICD-10-CM

## 2019-07-29 RX ORDER — LEVOTHYROXINE SODIUM 88 UG/1
88 TABLET ORAL
Qty: 90 TAB | Refills: 3 | OUTPATIENT
Start: 2019-07-29

## 2019-07-29 RX ORDER — CARVEDILOL 25 MG/1
25 TABLET ORAL 2 TIMES DAILY WITH MEALS
Qty: 180 TAB | Refills: 3 | OUTPATIENT
Start: 2019-07-29

## 2019-08-09 RX ORDER — CARVEDILOL 25 MG/1
TABLET ORAL
Qty: 180 TAB | Refills: 3 | Status: SHIPPED | OUTPATIENT
Start: 2019-08-09 | End: 2020-03-10 | Stop reason: SDUPTHER

## 2019-08-09 RX ORDER — LEVOTHYROXINE SODIUM 88 UG/1
88 TABLET ORAL
Qty: 90 TAB | Refills: 3 | Status: SHIPPED | OUTPATIENT
Start: 2019-08-09 | End: 2020-03-10 | Stop reason: SDUPTHER

## 2019-08-09 NOTE — TELEPHONE ENCOUNTER
Both of these were reordered on the 29th of July-she may need to call the mail order company if she still hasn't received them - will send re-order.

## 2019-09-25 PROBLEM — L25.5 RHUS DERMATITIS: Status: ACTIVE | Noted: 2019-09-25

## 2019-11-04 PROBLEM — M25.552 LEFT HIP PAIN: Status: ACTIVE | Noted: 2019-11-04

## 2020-01-14 ENCOUNTER — OFFICE VISIT (OUTPATIENT)
Dept: CARDIOLOGY CLINIC | Age: 75
End: 2020-01-14

## 2020-01-14 VITALS
OXYGEN SATURATION: 97 % | WEIGHT: 201 LBS | DIASTOLIC BLOOD PRESSURE: 78 MMHG | HEART RATE: 68 BPM | HEIGHT: 67 IN | BODY MASS INDEX: 31.55 KG/M2 | SYSTOLIC BLOOD PRESSURE: 122 MMHG | RESPIRATION RATE: 14 BRPM

## 2020-01-14 DIAGNOSIS — I10 ESSENTIAL HYPERTENSION: ICD-10-CM

## 2020-01-14 DIAGNOSIS — K21.9 GASTROESOPHAGEAL REFLUX DISEASE WITHOUT ESOPHAGITIS: ICD-10-CM

## 2020-01-14 DIAGNOSIS — R07.2 PRECORDIAL PAIN: ICD-10-CM

## 2020-01-14 DIAGNOSIS — E78.00 PURE HYPERCHOLESTEROLEMIA: ICD-10-CM

## 2020-01-14 DIAGNOSIS — I25.10 CORONARY ARTERY DISEASE INVOLVING NATIVE CORONARY ARTERY OF NATIVE HEART, ANGINA PRESENCE UNSPECIFIED: Primary | ICD-10-CM

## 2020-01-14 NOTE — PROGRESS NOTES
PATIENT ID VERIFIED WITH TWO PATIENT IDENTIFIERS. PATIENT MEDICATIONS REVIEWED AND APPROVED BY DR. Tereza Issa. Chief Complaint   Patient presents with    Chest Pain (Angina)     New patient evaluation referred by [facility]    Shortness of Breath       1. Have you been to the ER, urgent care clinic since your last visit? Hospitalized since your last visit? New patient evaluation    2. Have you seen or consulted any other health care providers outside of the [address] [place] [address] since your last visit? Include any pap smears or colon screening.  New patient evaluation

## 2020-01-14 NOTE — PROGRESS NOTES
Chung Pham is a 69 y.o. female is here for ER f/u, establish local cardiac care. Hx non-obstructive CAD, s/p cardiac cath 2014 (50% RCA, 60% first diagonal)--medical rx/no PCI, hypertension, dyslipidemia, followed by Dr. Mason Wooten in ER 12/19 with episode chest pain, dyspnea--neg w/u in ER. Mild DOYLE, some LE edema. No recurrence of chest pain since. On Coreg 25mg bid, Avalide, Amlodipine, HCTZ, ASA, pravachol. Intolerance to nitrates with severe headache. The patient denies orthopnea, PND, LE edema, palpitations, syncope, presyncope or fatigue. Patient Active Problem List    Diagnosis Date Noted    Left hip pain 11/04/2019    Rhus dermatitis 09/25/2019    Acute pain of right shoulder 06/16/2019    Sprain and strain 04/28/2019    Right non-suppurative otitis media 04/28/2019    Acute non-recurrent maxillary sinusitis 02/28/2019    Advance directive discussed with patient 05/18/2016    Hyperlipemia 11/28/2014    S/P cardiac cath 03/04/2014    CAD (coronary artery disease), native coronary artery 03/03/2014    Hypothyroidism 03/02/2014    GERD (gastroesophageal reflux disease) 03/02/2014    Anxiety 03/02/2014    Family history of ischemic heart disease 03/02/2014    Eczema 12/24/2013    Acne rosacea 12/24/2013    S/P cholecystectomy 12/24/2013    IBS (irritable bowel syndrome) 12/24/2013    Migraine 12/24/2013    HTN (hypertension) 12/24/2013      Case Francois DO  Past Medical History:   Diagnosis Date    Anxiety and depression     Chronic headaches . vascular    Chronic pancreatitis ([facility] [address] 12/24/2013    Depression     Duodenal ulcer     GERD (gastroesophageal reflux disease)     Hypertension     IBS (irritable bowel syndrome)     Menopause     Onychomycosis     Pancreatitis     Recurrent sinusitis     S/P cardiac cath 3/4/2014    3/3/14 1st diagonal: 60 %  proximal -- Mid RCA: There was a 55 % stenosis.        Sciatica     reccurrent      Past Surgical History:   Procedure Laterality Date    ABDOMEN SURGERY PROC UNLISTED      HX APPENDECTOMY      HX CHOLECYSTECTOMY       Allergies   Allergen Reactions    Nitrate Analogues Other (comments)     Severe headache    Sulfasalazine Hives    Bactrim [Sulfamethoprim] Rash      Family History   Problem Relation Age of Onset    Heart Disease Mother     Heart Disease Father       Social History     Socioeconomic History    Marital status:      Spouse name: Not on file    Number of children: 2    Years of education: Not on file    Highest education level: GED or equivalent   Occupational History    Not on file   Social Needs    Financial resource strain: Not hard at all   AGRIMAPS insecurity:     Worry: Never true     Inability: Never true   [employer] needs:     Medical: No     Non-medical: No   Tobacco Use    Smoking status: Never Smoker    Smokeless tobacco: Never Used   Substance and Sexual Activity    Alcohol use: No     Alcohol/week: 0.0 standard drinks     Frequency: Never     Drinks per session: Patient refused     Binge frequency: Never    Drug use: No    Sexual activity: Never   Lifestyle    Physical activity:     Days per week: 0 days     Minutes per session: 0 min    Stress: Only a little   Relationships    Social connections:     Talks on phone: More than three times a week     Gets together: More than three times a week     Attends Orthodoxy service: Never     Active member of club or organization: No     Attends meetings of clubs or organizations: Never     Relationship status:      Intimate partner violence:     Fear of current or ex partner: No     Emotionally abused: No     Physically abused: No     Forced sexual activity: No   Other Topics Concern     Service No    Blood Transfusions No    Caffeine Concern No    Occupational Exposure No    Hobby Hazards No    Sleep Concern Yes     Comment: not sleeping good    Stress Concern No    Weight Concern No    Special Diet No    Back Care No    Exercise No    Bike Helmet No    Seat Belt Yes    Self-Exams Yes     Comment: checks   Social History Narrative    Not on file      Current Outpatient Medications   Medication Sig    cyanocobalamin, vitamin B-12, 5,000 mcg TbDi Take 1 Tab by mouth daily.  amLODIPine (NORVASC) 5 mg tablet TAKE 1 TABLET EVERY DAY for pressure and heart    irbesartan-hydroCHLOROthiazide (AVALIDE) 300-12.5 mg per tablet TAKE 1 TABLET EVERY DAY FOR PRESSURE and heart    omeprazole (PRILOSEC) 40 mg capsule TAKE 1 CAPSULE EVERY DAY for stomach    levocetirizine (XYZAL) 5 mg tablet Take 1 Tab by mouth daily.  levothyroxine (SYNTHROID) 88 mcg tablet Take 1 Tab by mouth Daily (before breakfast).  carvedilol (COREG) 25 mg tablet TAKE 1 TABLET TWICE DAILY WITH MEALS FOR PRESSURE AND HEART    triamcinolone (ARISTOCORT) 0.5 % topical cream Apply  to affected area two (2) times a day. use thin layer    pravastatin (PRAVACHOL) 40 mg tablet Take 1 Tab by mouth nightly. Indications: heart and cholesterol    aspirin 81 mg chewable tablet Take 1 Tab by mouth daily.  cholecalciferol, VITAMIN D3, (VITAMIN D3) 5,000 unit tab tablet Take 1 Tab by mouth daily.  hydroCHLOROthiazide (HYDRODIURIL) 12.5 mg tablet Take 1 Tab by mouth daily. (Patient taking differently: Take 12.5 mg by mouth daily. Indications: NOT A CURRRENT MEDICATION)    nitroglycerin (NITROSTAT) 0.4 mg SL tablet 1 Tab by SubLINGual route every five (5) minutes as needed for Chest Pain (call 911 if not relieved by 3). No current facility-administered medications for this visit. Review of Symptoms:    CONST  No weight change. No fever, chills, sweats    ENT No visual changes, URI sx, sore throat    CV  See HPI   RESP  No cough, or sputum, wheezing. Also see HPI   GI  No abdominal pain or change in bowel habits. No heartburn or dysphagia. No melena or rectal bleeding.       No dysuria, urgency, frequency, hematuria   MSKEL  No joint pain, swelling. No muscle pain. SKIN  No rash or lesions. NEURO  No headache, syncope, or seizure. No weakness, loss of sensation, or paresthesias. PSYCH  No low mood or depression  No anxiety. HE/LYMPH  No easy bruising, abnormal bleeding, or enlarged glands.         Physical ExamPhysical Exam:    Visit Vitals  /78 (BP 1 Location: Right arm, BP Patient Position: Sitting) Comment (BP Patient Position): large cuff   Pulse 68   Resp 14   Ht 5' 7\" (1.702 m)   Wt 201 lb (91.2 kg)   SpO2 97%   BMI 31.48 kg/m²     Gen: NAD  HEENT:  PERRL, throat clear  Neck: no adenopathy, no thyromegaly, no JVD   Heart:  Regular,Nl S1S2,  no murmur, gallop or rub.   Lungs:  clear  Abdomen:   Soft, non-tender, bowel sounds are active.   Extremities:  No edema  Pulse: symmetric  Neuro: A&O times 3, No focal neuro deficits    Cardiographics    ECG: from 12/13/19--NSR, wnl    Labs:   Lab Results   Component Value Date/Time    Sodium 139 12/13/2019 11:25 AM    Sodium 143 06/14/2019 11:05 AM    Sodium 136 05/21/2019 09:32 PM    Sodium 141 05/10/2019 04:29 PM    Sodium 142 12/13/2018 11:23 AM    Potassium 4.0 12/13/2019 11:25 AM    Potassium 4.8 06/14/2019 11:05 AM    Potassium 4.0 05/21/2019 09:32 PM    Potassium 3.9 05/10/2019 04:29 PM    Potassium 4.6 12/13/2018 11:23 AM    Chloride 103 12/13/2019 11:25 AM    Chloride 102 06/14/2019 11:05 AM    Chloride 100 05/21/2019 09:32 PM    Chloride 101 05/10/2019 04:29 PM    Chloride 102 12/13/2018 11:23 AM    CO2 29 12/13/2019 11:25 AM    CO2 26 06/14/2019 11:05 AM    CO2 29 05/21/2019 09:32 PM    CO2 29 05/10/2019 04:29 PM    CO2 28 12/13/2018 11:23 AM    Anion gap 7 12/13/2019 11:25 AM    Anion gap 7 05/21/2019 09:32 PM    Anion gap 11 05/10/2019 04:29 PM    Anion gap 11 03/04/2014 04:30 AM    Anion gap 9 03/03/2014 04:54 AM    Glucose 131 (H) 12/13/2019 11:25 AM    Glucose 110 (H) 06/14/2019 11:05 AM    Glucose 108 (H) 05/21/2019 09:32 PM Glucose 109 (H) 05/10/2019 04:29 PM    Glucose 113 (H) 12/13/2018 11:23 AM    BUN 19 12/13/2019 11:25 AM    BUN 17 06/14/2019 11:05 AM    BUN 22 (H) 05/21/2019 09:32 PM    BUN 19 05/10/2019 04:29 PM    BUN 19 12/13/2018 11:23 AM    Creatinine 1.34 (H) 12/13/2019 11:25 AM    Creatinine 1.13 (H) 06/14/2019 11:05 AM    Creatinine 1.27 (H) 05/21/2019 09:32 PM    Creatinine 1.65 (H) 05/10/2019 04:29 PM    Creatinine 1.34 (H) 12/13/2018 11:23 AM    BUN/Creatinine ratio 14 12/13/2019 11:25 AM    BUN/Creatinine ratio 15 06/14/2019 11:05 AM    BUN/Creatinine ratio 17 05/21/2019 09:32 PM    BUN/Creatinine ratio 12 05/10/2019 04:29 PM    BUN/Creatinine ratio 14 12/13/2018 11:23 AM    GFR est AA 47 (L) 12/13/2019 11:25 AM    GFR est AA 56 (L) 06/14/2019 11:05 AM    GFR est AA 50 (L) 05/21/2019 09:32 PM    GFR est AA 37 (L) 05/10/2019 04:29 PM    GFR est AA 46 (L) 12/13/2018 11:23 AM    GFR est non-AA 39 (L) 12/13/2019 11:25 AM    GFR est non-AA 49 (L) 06/14/2019 11:05 AM    GFR est non-AA 41 (L) 05/21/2019 09:32 PM    GFR est non-AA 31 (L) 05/10/2019 04:29 PM    GFR est non-AA 40 (L) 12/13/2018 11:23 AM    Calcium 9.4 12/13/2019 11:25 AM    Calcium 9.8 06/14/2019 11:05 AM    Calcium 9.2 05/21/2019 09:32 PM    Calcium 9.4 05/10/2019 04:29 PM    Calcium 9.5 12/13/2018 11:23 AM    Bilirubin, total 0.5 12/13/2019 11:25 AM    Bilirubin, total 0.5 06/14/2019 11:05 AM    Bilirubin, total 0.3 05/21/2019 09:32 PM    Bilirubin, total 0.5 05/10/2019 04:29 PM    Bilirubin, total 0.5 06/08/2018 10:28 AM    AST (SGOT) 18 12/13/2019 11:25 AM    AST (SGOT) 14 06/14/2019 11:05 AM    AST (SGOT) 13 (L) 05/21/2019 09:32 PM    AST (SGOT) 22 05/10/2019 04:29 PM    AST (SGOT) 17 06/08/2018 10:28 AM    Alk. phosphatase 58 12/13/2019 11:25 AM    Alk. phosphatase 66 06/14/2019 11:05 AM    Alk. phosphatase 76 05/21/2019 09:32 PM    Alk. phosphatase 71 05/10/2019 04:29 PM    Alk.  phosphatase 68 06/08/2018 10:28 AM    Protein, total 6.8 12/13/2019 11:25 AM    Protein, total 6.8 06/14/2019 11:05 AM    Protein, total 7.8 05/21/2019 09:32 PM    Protein, total 7.4 05/10/2019 04:29 PM    Protein, total 7.3 06/08/2018 10:28 AM    Albumin 3.9 12/13/2019 11:25 AM    Albumin 4.3 06/14/2019 11:05 AM    Albumin 3.6 05/21/2019 09:32 PM    Albumin 4.1 05/10/2019 04:29 PM    Albumin 4.8 06/08/2018 10:28 AM    Globulin 2.9 12/13/2019 11:25 AM    Globulin 4.2 (H) 05/21/2019 09:32 PM    Globulin 3.3 05/10/2019 04:29 PM    A-G Ratio 1.3 12/13/2019 11:25 AM    A-G Ratio 1.7 06/14/2019 11:05 AM    A-G Ratio 0.9 (L) 05/21/2019 09:32 PM    A-G Ratio 1.2 05/10/2019 04:29 PM    A-G Ratio 1.9 06/08/2018 10:28 AM    ALT (SGPT) 24 12/13/2019 11:25 AM    ALT (SGPT) 11 06/14/2019 11:05 AM    ALT (SGPT) 14 05/21/2019 09:32 PM    ALT (SGPT) 23 05/10/2019 04:29 PM    ALT (SGPT) 14 06/08/2018 10:28 AM     No results found for: CPK, CPKX, CPX  Lab Results   Component Value Date/Time    Cholesterol, total 184 06/14/2019 11:05 AM    Cholesterol, total 178 06/08/2018 10:28 AM    Cholesterol, total 181 06/08/2017 10:10 AM    Cholesterol, total 178 09/28/2016 09:41 AM    Cholesterol, total 209 (H) 05/16/2016 09:00 AM    HDL Cholesterol 50 06/14/2019 11:05 AM    HDL Cholesterol 59 06/08/2018 10:28 AM    HDL Cholesterol 54 06/08/2017 10:10 AM    HDL Cholesterol 46 09/28/2016 09:41 AM    HDL Cholesterol 55 05/16/2016 09:00 AM    LDL, calculated 112 (H) 06/14/2019 11:05 AM    LDL, calculated 97 06/08/2018 10:28 AM    LDL, calculated 104 (H) 06/08/2017 10:10 AM    LDL, calculated 109 (H) 09/28/2016 09:41 AM    LDL, calculated 129 (H) 05/16/2016 09:00 AM    Triglyceride 110 06/14/2019 11:05 AM    Triglyceride 109 06/08/2018 10:28 AM    Triglyceride 117 06/08/2017 10:10 AM    Triglyceride 116 09/28/2016 09:41 AM    Triglyceride 124 05/16/2016 09:00 AM    CHOL/HDL Ratio 5.0 03/03/2014 04:54 AM     No results found for this or any previous visit.     Assessment:         Patient Active Problem List    Diagnosis Date Noted    Left hip pain 11/04/2019    Rhus dermatitis 09/25/2019    Acute pain of right shoulder 06/16/2019    Sprain and strain 04/28/2019    Right non-suppurative otitis media 04/28/2019    Acute non-recurrent maxillary sinusitis 02/28/2019    Advance directive discussed with patient 05/18/2016    Hyperlipemia 11/28/2014    S/P cardiac cath 03/04/2014    CAD (coronary artery disease), native coronary artery 03/03/2014    Hypothyroidism 03/02/2014    GERD (gastroesophageal reflux disease) 03/02/2014    Anxiety 03/02/2014    Family history of ischemic heart disease 03/02/2014    Eczema 12/24/2013    Acne rosacea 12/24/2013    S/P cholecystectomy 12/24/2013    IBS (irritable bowel syndrome) 12/24/2013    Migraine 12/24/2013    HTN (hypertension) 12/24/2013      69 y.o. female is here for ER f/u, establish local cardiac care. Hx non-obstructive CAD, s/p cardiac cath 2014 (50% RCA, 60% first diagonal)--medical rx/no PCI, hypertension, dyslipidemia, followed by Dr. Dave Sifuentes in ER 12/19 with episode chest pain, dyspnea--neg w/u in ER. Mild DOYLE, some LE edema. No recurrence of chest pain since. On Coreg 25mg bid, Avalide, Amlodipine, HCTZ, ASA, pravachol. Intolerance to nitrates with severe headache. Plan:     Chest pain, somewhat atypical but known CAD (cath with non-obstructive CAD 2014--medical rx)  Hypertension  Dyslipidemia  Continue current meds  Plan Lexiscan MPI--r/o ischemia.   STAY on Coreg--call w/ results    Katlin Feliz MD

## 2020-01-17 ENCOUNTER — HOSPITAL ENCOUNTER (OUTPATIENT)
Dept: NON INVASIVE DIAGNOSTICS | Age: 75
Discharge: HOME | End: 2020-01-17
Attending: INTERNAL MEDICINE
Payer: MEDICARE

## 2020-01-17 ENCOUNTER — HOSPITAL ENCOUNTER (OUTPATIENT)
Dept: NUCLEAR MEDICINE | Age: 75
Discharge: HOME | End: 2020-01-17
Attending: INTERNAL MEDICINE
Payer: MEDICARE

## 2020-01-17 DIAGNOSIS — I10 ESSENTIAL HYPERTENSION: ICD-10-CM

## 2020-01-17 DIAGNOSIS — E78.00 PURE HYPERCHOLESTEROLEMIA: ICD-10-CM

## 2020-01-17 DIAGNOSIS — K21.9 GASTROESOPHAGEAL REFLUX DISEASE WITHOUT ESOPHAGITIS: ICD-10-CM

## 2020-01-17 DIAGNOSIS — I25.10 CORONARY ARTERY DISEASE INVOLVING NATIVE CORONARY ARTERY OF NATIVE HEART, ANGINA PRESENCE UNSPECIFIED: ICD-10-CM

## 2020-01-17 DIAGNOSIS — R07.2 PRECORDIAL PAIN: ICD-10-CM

## 2020-01-17 LAB
STRESS BASELINE HR: 60 BPM
STRESS ESTIMATED WORKLOAD: 1 METS
STRESS EXERCISE DUR MIN: NORMAL
STRESS PEAK DIAS BP: 81 MMHG
STRESS PEAK SYS BP: 172 MMHG
STRESS PERCENT HR ACHIEVED: 71 %
STRESS POST PEAK HR: 105 BPM
STRESS RATE PRESSURE PRODUCT: NORMAL BPM*MMHG
STRESS TARGET HR: 148 BPM

## 2020-01-17 PROCEDURE — 93017 CV STRESS TEST TRACING ONLY: CPT

## 2020-01-17 PROCEDURE — A9500 TC99M SESTAMIBI: HCPCS

## 2020-01-17 PROCEDURE — 74011250636 HC RX REV CODE- 250/636: Performed by: INTERNAL MEDICINE

## 2020-01-17 RX ORDER — SODIUM CHLORIDE 0.9 % (FLUSH) 0.9 %
10 SYRINGE (ML) INJECTION AS NEEDED
Status: DISCONTINUED | OUTPATIENT
Start: 2020-01-17 | End: 2020-01-21 | Stop reason: HOSPADM

## 2020-01-17 RX ADMIN — REGADENOSON 0.4 MG: 0.08 INJECTION, SOLUTION INTRAVENOUS at 10:16

## 2020-01-17 RX ADMIN — Medication 10 ML: at 10:16

## 2020-01-21 ENCOUNTER — TELEPHONE (OUTPATIENT)
Dept: CARDIOLOGY CLINIC | Age: 75
End: 2020-01-21

## 2020-01-21 NOTE — TELEPHONE ENCOUNTER
----- Message from Dwayne Lechuga MD sent at 1/17/2020  2:40 PM EST -----  Regarding: stress MPI  Advise stress nuclear scan looks normal--no blockages or ischemia, normal LV pumping function.   Thanks [facility]

## 2021-04-09 ENCOUNTER — TELEPHONE (OUTPATIENT)
Dept: FAMILY MEDICINE CLINIC | Age: 76
End: 2021-04-09

## 2021-04-09 ENCOUNTER — VIRTUAL VISIT (OUTPATIENT)
Dept: FAMILY MEDICINE CLINIC | Age: 76
End: 2021-04-09
Payer: MEDICARE

## 2021-04-09 DIAGNOSIS — H69.91 EUSTACHIAN TUBE DISORDER, RIGHT: ICD-10-CM

## 2021-04-09 DIAGNOSIS — M54.2 CERVICALGIA: Primary | ICD-10-CM

## 2021-04-09 PROCEDURE — G2025 DIS SITE TELE SVCS RHC/FQHC: HCPCS | Performed by: NURSE PRACTITIONER

## 2021-04-09 RX ORDER — DICLOFENAC SODIUM 50 MG/1
50 TABLET, DELAYED RELEASE ORAL
Qty: 20 TAB | Refills: 0 | Status: SHIPPED | OUTPATIENT
Start: 2021-04-09 | End: 2021-07-08 | Stop reason: ALTCHOICE

## 2021-04-09 NOTE — TELEPHONE ENCOUNTER
Patient has an appointment today but is having extreme back pain going to her neck.   Does she need to come in sooner or maybe the ER?

## 2021-04-09 NOTE — PROGRESS NOTES
Ilsa Rodrigues is a 78 y.o. female evaluated via telephone on 4/9/2021. Consent:    She and/or health care decision maker is aware that that she may receive a bill for this telephone service, depending on her insurance coverage, and has provided verbal consent to proceed: Yes      Documentation:  I communicated with the patient and/or health care decision maker about neck pain. The patient reports sudden onset neck pain at C7-T1 that started three days ago. Worsening with time. Worse when she looks down at the floor. She is able to rotate her neck without difficulty. Denies injury. She is very active - has been out in the yard more often. She also reports her R ear was hurting this morning when the wind blew in it. Denies any fever, cough, cold symptoms. She does take Xyzal daily. Details of this discussion including any medical advice provided: Rx diclofenac. She is allergic to sulfa antibiotics but takes salicylates without difficulty like ASA. Discussed MOA and how to use. I recommend heat, gentle stretching, and rest. Discussed ear symptoms, sounds like eustachian tube dysfunction. Continue antihistamine and monitor. Come in for an in person visit in 1 week if no improvement. I affirm this is a Patient Initiated Episode with an Established Patient who has not had a related appointment within my department in the past 7 days or scheduled within the next 24 hours. ASSESSMENT AND PLAN:       ICD-10-CM ICD-9-CM    1. Cervicalgia  M54.2 723.1    2. Eustachian tube disorder, right  H69.91 381. 9          Patient aware of plan of care and verbalized understanding. Questions answered. RTC as scheduled to see her PCP, or sooner if needed.     Shelbi Thomson NP    Total Time: minutes: 11-20 minutes    Note: not billable if this call serves to triage the patient into an appointment for the relevant concern      Shelbi Thomson NP

## 2021-04-09 NOTE — PROGRESS NOTES
Pt reports a 2-3 day history of neck pain. She reports that she has nothing that she can related the pain to. (No specific injuries or incident). She states that she's had pain in her shoulders previously and was told it was a pulled muscle. She has been told to \"slow down and not do so much\" but states that she can't slow down or sit still and do nothing. She reports that she was taking [employer], but has taken so much she doesn't think it's helping anymore. She states that looking down makes it worse. Pain is about 7/10. She states that she has repeated trouble with her Rt ear. She states that the wind is hurting it now. Denies fever, cough, sore throat, trouble breathing. Identified pt with two pt identifiers(name and ). Reviewed record in preparation for visit and have obtained necessary documentation. Chief Complaint   Patient presents with    Neck Pain    Ear Pain      There were no vitals filed for this visit. Health Maintenance Due   Topic    COVID-19 Vaccine (1)    Shingrix Vaccine Age 50> (1 of 2)    Lipid Screen      Health Maintenance Review: Patient reminded of \"due or due soon\" health maintenance. I have asked the patient to contact his/her primary care provider (PCP) for follow-up on his/her health maintenance. Coordination of Care Questionnaire:  :   1) Have you been to an emergency room, urgent care, or hospitalized since your last visit? If yes, where when, and reason for visit? no       2. Have seen or consulted any other health care provider since your last visit? If yes, where when, and reason for visit?   NO

## 2021-04-09 NOTE — PATIENT INSTRUCTIONS
Neck Pain: Care Instructions Your Care Instructions You can have neck pain anywhere from the bottom of your head to the top of your shoulders. It can spread to the upper back or arms. Injuries, painting a ceiling, sleeping with your neck twisted, staying in one position for too long, and many other activities can cause neck pain. Most neck pain gets better with home care. Your doctor may recommend medicine to relieve pain or relax your muscles. He or she may suggest exercise and physical therapy to increase flexibility and relieve stress. You may need to wear a special (cervical) collar to support your neck for a day or two. Follow-up care is a key part of your treatment and safety. Be sure to make and go to all appointments, and call your doctor if you are having problems. It's also a good idea to know your test results and keep a list of the medicines you take. How can you care for yourself at home? · Try using a heating pad on a low or medium setting for 15 to 20 minutes every 2 or 3 hours. Try a warm shower in place of one session with the heating pad. · You can also try an ice pack for 10 to 15 minutes every 2 to 3 hours. Put a thin cloth between the ice and your skin. · Take pain medicines exactly as directed. ? If the doctor gave you a prescription medicine for pain, take it as prescribed. ? If you are not taking a prescription pain medicine, ask your doctor if you can take an over-the-counter medicine. · If your doctor recommends a cervical collar, wear it exactly as directed. When should you call for help? Call your doctor now or seek immediate medical care if: 
  · You have new or worsening numbness in your arms, buttocks or legs.  
  · You have new or worsening weakness in your arms or legs. (This could make it hard to stand up.)  
  · You lose control of your bladder or bowels.   
Watch closely for changes in your health, and be sure to contact your doctor if: 
  · Your neck pain is getting worse.  
  · You are not getting better after 1 week.  
  · You do not get better as expected. Where can you learn more? Go to http://www.gray.com/ Enter [id number] in the search box to learn more about \"Neck Pain: Care Instructions. \" Current as of: November 16, 2020               Content Version: 12.8 © 5005-6113 [employer]. Care instructions adapted under license by [email or web address] (which disclaims liability or warranty for this information). If you have questions about a medical condition or this instruction, always ask your healthcare professional. [place] [address] any warranty or liability for your use of this information.

## 2021-04-09 NOTE — TELEPHONE ENCOUNTER
----- Message from Deniz Nielsen sent at 4/9/2021 10:41 AM EDT -----  Regarding: Do Good/Telephone  Level 1/Escalated Issue      Caller's first and last name and relationship (if not the patient):n/a      Best contact number(s): [phone]      What are the symptoms:hurting alot on top of back bone beneath the neck      Transfer successful - yes/no (include outcome):  yes      Transfer declined - yes/no (include reason):n/a      Was caller advised to seek appropriate level of care - yes/no: yes      Details to clarify the request:         Deniz Nielsen

## 2021-07-08 ENCOUNTER — OFFICE VISIT (OUTPATIENT)
Dept: FAMILY MEDICINE CLINIC | Age: 76
End: 2021-07-08
Payer: MEDICARE

## 2021-07-08 VITALS
HEIGHT: 67 IN | OXYGEN SATURATION: 98 % | TEMPERATURE: 97.8 F | BODY MASS INDEX: 31.17 KG/M2 | RESPIRATION RATE: 17 BRPM | SYSTOLIC BLOOD PRESSURE: 138 MMHG | WEIGHT: 198.6 LBS | HEART RATE: 81 BPM | DIASTOLIC BLOOD PRESSURE: 70 MMHG

## 2021-07-08 DIAGNOSIS — M54.31 RIGHT SIDED SCIATICA: Primary | ICD-10-CM

## 2021-07-08 DIAGNOSIS — E03.4 HYPOTHYROIDISM DUE TO ACQUIRED ATROPHY OF THYROID: ICD-10-CM

## 2021-07-08 PROCEDURE — 96372 THER/PROPH/DIAG INJ SC/IM: CPT | Performed by: NURSE PRACTITIONER

## 2021-07-08 PROCEDURE — 99214 OFFICE O/P EST MOD 30 MIN: CPT | Performed by: NURSE PRACTITIONER

## 2021-07-08 RX ORDER — LEVOTHYROXINE SODIUM 88 UG/1
88 TABLET ORAL
Qty: 90 TABLET | Refills: 3 | Status: SHIPPED | OUTPATIENT
Start: 2021-07-08

## 2021-07-08 RX ORDER — METHYLPREDNISOLONE ACETATE 40 MG/ML
40 INJECTION, SUSPENSION INTRA-ARTICULAR; INTRALESIONAL; INTRAMUSCULAR; SOFT TISSUE ONCE
Qty: 1 VIAL | Refills: 0
Start: 2021-07-08 | End: 2021-07-08

## 2021-07-08 RX ORDER — KETOROLAC TROMETHAMINE 10 MG/1
10 TABLET, FILM COATED ORAL
Qty: 15 TABLET | Refills: 0 | OUTPATIENT
Start: 2021-07-08 | End: 2021-07-10

## 2021-07-08 RX ORDER — LEVOCETIRIZINE DIHYDROCHLORIDE 5 MG/1
5 TABLET, FILM COATED ORAL DAILY
Qty: 90 TABLET | Refills: 3 | Status: SHIPPED | OUTPATIENT
Start: 2021-07-08

## 2021-07-08 RX ORDER — PREDNISONE 10 MG/1
TABLET ORAL
Qty: 22 TABLET | Refills: 0 | OUTPATIENT
Start: 2021-07-08 | End: 2021-07-10

## 2021-07-08 NOTE — PROGRESS NOTES
Chief Complaint   Patient presents with    Hip Pain     R hip buttock pain       HPI:     is a 78 y.o. female in today for an acute visit with a CC of R posterior hip pain that radiates down the back of her R leg. The hip has been aching for a few weeks but has become really severe over the past few days, worse with prolonged periods of sitting or laying. Some relief with heat. No injury, no trauma. Allergies   Allergen Reactions    Nitrate Analogues Other (comments)     Severe headache    Bactrim [Sulfamethoprim] Rash       Current Outpatient Medications   Medication Sig    predniSONE (DELTASONE) 10 mg tablet 3 tabs PO every day x 4 days, then 2 tabs PO every day x 4 days, then 1 tab PO every day x 2 days    ketorolac (TORADOL) 10 mg tablet Take 1 Tablet by mouth three (3) times daily as needed for Pain.  levocetirizine (XYZAL) 5 mg tablet Take 1 Tablet by mouth daily.  levothyroxine (SYNTHROID) 88 mcg tablet Take 1 Tablet by mouth Daily (before breakfast).  methylPREDNISolone acetate (DEPO-MEDROL) 40 mg/mL injection 1 mL by IntraMUSCular route once for 1 dose.  irbesartan-hydroCHLOROthiazide (AVALIDE) 300-12.5 mg per tablet TAKE 1 TABLET EVERY DAY FOR PRESSURE and heart    pravastatin (PRAVACHOL) 40 mg tablet Take 1 Tab by mouth nightly. Indications: heart and cholesterol    amLODIPine (NORVASC) 5 mg tablet TAKE 1 TABLET EVERY DAY for pressure and heart    omeprazole (PRILOSEC) 40 mg capsule TAKE 1 CAPSULE EVERY DAY for stomach    carvediloL (COREG) 25 mg tablet TAKE 1 TABLET TWICE DAILY WITH MEALS FOR PRESSURE AND HEART    betamethasone dipropionate (DIPROLENE) 0.05 % ointment Apply  to affected area two (2) times a day.  lidocaine 4 % spra 5 Sprays by Apply Externally route two (2) times daily as needed for Pain (itch). Indications: itching    cyanocobalamin, vitamin B-12, 5,000 mcg TbDi Take 1 Tab by mouth daily.     aspirin 81 mg chewable tablet Take 1 Tab by mouth daily.    nitroglycerin (NITROSTAT) 0.4 mg SL tablet 1 Tab by SubLINGual route every five (5) minutes as needed for Chest Pain (call 911 if not relieved by 3).  cholecalciferol, VITAMIN D3, (VITAMIN D3) 5,000 unit tab tablet Take 1 Tab by mouth daily. No current facility-administered medications for this visit. Past Medical History:   Diagnosis Date    Anxiety and depression     Chronic headaches . vascular    Chronic pancreatitis ([address] 12/24/2013    Depression     Duodenal ulcer     GERD (gastroesophageal reflux disease)     Hypertension     IBS (irritable bowel syndrome)     Menopause     Onychomycosis     Pancreatitis     Recurrent sinusitis     S/P cardiac cath 3/4/2014    3/3/14 1st diagonal: 60 %  proximal -- Mid RCA: There was a 55 % stenosis.  Sciatica     reccurrent       Family History   Problem Relation Age of Onset    Heart Disease Mother     Heart Disease Father        ROS:  Denies fever, chills, cough, chest pain, SOB,  nausea, vomiting, diarrhea, dysuria. Denies rashes, wounds, + arthralgias, weakness, numbness, visual changes, depression. Denies wt loss, wt gain, hemoptysis, hematochezia or melena. Patient is not experiencing chest pain radiating to the jaw and/or down the arms. Physical Examination:    /70 (BP 1 Location: Right arm, BP Patient Position: Sitting, BP Cuff Size: Adult)   Pulse 81   Temp 97.8 °F (36.6 °C) (Temporal)   Resp 17   Ht 5' 7\" (1.702 m)   Wt 198 lb 9.6 oz (90.1 kg)   SpO2 98%   BMI 31.11 kg/m²     Wt Readings from Last 3 Encounters:   07/08/21 198 lb 9.6 oz (90.1 kg)   11/10/20 200 lb 3.2 oz (90.8 kg)   01/14/20 201 lb (91.2 kg)     Constitutional: WDWN Female in no acute distress  HENT:  NC/AT  EYES: EOMI  Respiratory:  Respirations even and unlabored without use of accessory muscles  Musculoskeletal:  No cyanosis, clubbing or edema of extremities. Moves all extremities without difficulty.  Tenderness of R piriformis muscle with + SLR on R. Neurologic:  Smooth, even gait without assistance, CN 2-12 grossly intact. Skin: intact and warm to the touch, no rash   Lymphadenopathy: no cervical or supraclavicular nodes  Psych: Pleasant and appropriate. Judgment normal. Alert and oriented x 3. ASSESSMENT AND PLAN:       ICD-10-CM ICD-9-CM    1. Right sided sciatica  M54.31 724. 3 predniSONE (DELTASONE) 10 mg tablet      ketorolac (TORADOL) 10 mg tablet      METHYLPREDNISOLONE ACETATE INJECTION 40 MG      NV THER/PROPH/DIAG INJECTION, SUBCUT/IM      methylPREDNISolone acetate (DEPO-MEDROL) 40 mg/mL injection   2. Hypothyroidism due to acquired atrophy of thyroid  E03.4 244.8 levothyroxine (SYNTHROID) 88 mcg tablet     246.8      Discussed dx. Recommended heat. Patient handout provided with stretches to do at home. Depomedrol in the office. Start Rxs tomorrow. Consider PT referral or daily NSAID if no relief. Patient aware of plan of care and verbalized understanding. Questions answered. RTC in the next few months for her check up, or sooner if needed.     Leslie Kinsey, BUCKY

## 2021-07-08 NOTE — PATIENT INSTRUCTIONS
Sciatica: Exercises  Introduction  Here are some examples of typical rehabilitation exercises for your condition. Start each exercise slowly. Ease off the exercise if you start to have pain. Your doctor or physical therapist will tell you when you can start these exercises and which ones will work best for you. When you are not being active, find a comfortable position for rest. Some people are comfortable on the floor or a medium-firm bed with a small pillow under their head and another under their knees. Some people prefer to lie on their side with a pillow between their knees. Don't stay in one position for too long. Take short walks (10 to 20 minutes) every 2 to 3 hours. Avoid slopes, hills, and stairs until you feel better. Walk only distances you can manage without pain, especially leg pain. How to do the exercises  Back stretches   1. Get down on your hands and knees on the floor. 2. Relax your head and allow it to droop. Round your back up toward the ceiling until you feel a nice stretch in your upper, middle, and lower back. Hold this stretch for as long as it feels comfortable, or about 15 to 30 seconds. 3. Return to the starting position with a flat back while you are on your hands and knees. 4. Let your back sway by pressing your stomach toward the floor. Lift your buttocks toward the ceiling. 5. Hold this position for 15 to 30 seconds. 6. Repeat 2 to 4 times. Follow-up care is a key part of your treatment and safety. Be sure to make and go to all appointments, and call your doctor if you are having problems. It's also a good idea to know your test results and keep a list of the medicines you take. Where can you learn more? Go to http://www.gray.com/  Enter A887 in the search box to learn more about \"Sciatica: Exercises. \"  Current as of: November 16, 2020               Content Version: 12.8  © 0185-8599 Healthwise, Incorporated.    Care instructions adapted under license by [employer] (which disclaims liability or warranty for this information). If you have questions about a medical condition or this instruction, always ask your healthcare professional. [address] any warranty or liability for your use of this information. Sciatica: Care Instructions  Your Care Instructions     Sciatica (say \"lgg-OQ-yh-kuh\") is an irritation of one of the sciatic nerves, which come from the spinal cord in the lower back. The sciatic nerves and their branches extend down through the buttock to the foot. Sciatica can develop when an injured disc in the back irritates or presses against a spinal nerve root. Its main symptom is pain, numbness, or weakness that is often worse in the leg or foot than in the back. Sciatica often will improve and go away with time. Early treatment usually includes medicines and exercises to relieve pain. Follow-up care is a key part of your treatment and safety. Be sure to make and go to all appointments, and call your doctor if you are having problems. It's also a good idea to know your test results and keep a list of the medicines you take. How can you care for yourself at home? · Take pain medicines exactly as directed. ? If the doctor gave you a prescription medicine for pain, take it as prescribed. ? If you are not taking a prescription pain medicine, ask your doctor if you can take an over-the-counter medicine. · Use heat or ice to relieve pain. ? To apply heat, put a warm water bottle, heating pad set on low, or warm cloth on your back. Do not go to sleep with a heating pad on your skin. ? To use ice, put ice or a cold pack on the area for 10 to 20 minutes at a time. Put a thin cloth between the ice and your skin. · Avoid sitting if possible, unless it feels better than standing. · Alternate lying down with short walks.  Increase your walking distance as you are able to without making your symptoms worse.  · Do not do anything that makes your symptoms worse. When should you call for help? Call 911 anytime you think you may need emergency care. For example, call if:    · You are unable to move a leg at all. Call your doctor now or seek immediate medical care if:    · You have new or worse symptoms in your legs or buttocks. Symptoms may include:  ? Numbness or tingling. ? Weakness. ? Pain.     · You lose bladder or bowel control. Watch closely for changes in your health, and be sure to contact your doctor if:    · You are not getting better as expected. Where can you learn more? Go to http://www.gray.com/  Enter Z239 in the search box to learn more about \"Sciatica: Care Instructions. \"  Current as of: November 16, 2020               Content Version: 12.8  © 1484-8089 Healthwise, Incorporated. Care instructions adapted under license by [employer] (which disclaims liability or warranty for this information). If you have questions about a medical condition or this instruction, always ask your healthcare professional. [address] any warranty or liability for your use of this information.

## 2021-07-10 ENCOUNTER — HOSPITAL ENCOUNTER (EMERGENCY)
Age: 76
Discharge: HOME | End: 2021-07-10
Attending: EMERGENCY MEDICINE
Payer: MEDICARE

## 2021-07-10 VITALS
BODY MASS INDEX: 32.14 KG/M2 | TEMPERATURE: 98.4 F | SYSTOLIC BLOOD PRESSURE: 149 MMHG | HEART RATE: 70 BPM | DIASTOLIC BLOOD PRESSURE: 72 MMHG | HEIGHT: 66 IN | OXYGEN SATURATION: 98 % | RESPIRATION RATE: 18 BRPM | WEIGHT: 200 LBS

## 2021-07-10 DIAGNOSIS — M70.61 GREATER TROCHANTERIC BURSITIS OF RIGHT HIP: Primary | ICD-10-CM

## 2021-07-10 PROCEDURE — 99283 EMERGENCY DEPT VISIT LOW MDM: CPT

## 2021-07-10 RX ORDER — LIDOCAINE 50 MG/G
PATCH TOPICAL
Qty: 5 EACH | Refills: 0 | Status: SHIPPED | OUTPATIENT
Start: 2021-07-10

## 2021-07-10 RX ORDER — PREDNISONE 10 MG/1
TABLET ORAL
Qty: 21 TABLET | Refills: 0 | Status: SHIPPED | OUTPATIENT
Start: 2021-07-10 | End: 2021-12-03 | Stop reason: ALTCHOICE

## 2021-07-10 NOTE — ED TRIAGE NOTES
Right sided leg pain , radiating , saw PCP and has not started steroids prescribed .  Good PMS, no relief with toradol

## 2021-07-10 NOTE — ED PROVIDER NOTES
[facility]  EMERGENCY DEPARTMENT HISTORY AND PHYSICAL EXAM         Date of Service: 7/10/2021   Patient Name: Brooke Weaver   YOB: 1945  Medical Record Number: [id number]    History of Presenting Illness     Chief Complaint   Patient presents with    Leg Pain        History Provided By:  patient    Additional History:   Brooke Weaver is a 78 y.o. female who presents ambulatory to the ED with cc of right hip pain that has been ongoing for several days. It is worse when she lies on her right side, and when she ambulated for more than a few steps. She denies back pain, UTI sx, lower extremity numbness or tingling. She saw her PCP 2 days ago, received Toradol IM and RX for Toradol and prednisone PO, but has had no relief. Denies any trauma of unusual activity. There are no other complaints, changes or physical findings at this time. Primary Care Provider: Michael Salgado NP   Specialist:    Past History     Past Medical History:   Past Medical History:   Diagnosis Date    Anxiety and depression     Chronic headaches . vascular    Chronic pancreatitis ([address] 12/24/2013    Depression     Duodenal ulcer     GERD (gastroesophageal reflux disease)     Hypertension     IBS (irritable bowel syndrome)     Menopause     Onychomycosis     Pancreatitis     Recurrent sinusitis     S/P cardiac cath 3/4/2014    3/3/14 1st diagonal: 60 %  proximal -- Mid RCA: There was a 55 % stenosis.        Sciatica     reccurrent        Past Surgical History:   Past Surgical History:   Procedure Laterality Date    HX APPENDECTOMY      HX CHOLECYSTECTOMY      NC ABDOMEN SURGERY PROC UNLISTED          Family History:   Family History   Problem Relation Age of Onset    Heart Disease Mother     Heart Disease Father         Social History:   Social History     Tobacco Use    Smoking status: Never Smoker    Smokeless tobacco: Never Used   Substance Use Topics  Alcohol use: No     Alcohol/week: 0.0 standard drinks    Drug use: No        Allergies: Allergies   Allergen Reactions    Nitrate Analogues Other (comments)     Severe headache    Bactrim [Sulfamethoprim] Rash        Review of Systems   Review of Systems   Constitutional: Negative for appetite change, chills and fever. HENT: Negative for congestion. Eyes: Negative for visual disturbance. Respiratory: Negative for cough, shortness of breath and wheezing. Cardiovascular: Negative for chest pain, palpitations and leg swelling. Gastrointestinal: Negative for abdominal pain. Genitourinary: Negative for dysuria, frequency and urgency. Musculoskeletal: Positive for arthralgias. Negative for back pain, joint swelling, myalgias and neck stiffness. Skin: Negative for rash. Neurological: Negative for dizziness, syncope, weakness and headaches. Hematological: Negative for adenopathy. Psychiatric/Behavioral: Negative for behavioral problems and dysphoric mood. Physical Exam  Physical Exam  Vitals and nursing note reviewed. Constitutional:       General: She is not in acute distress. Appearance: She is well-developed. She is obese. HENT:      Head: Normocephalic and atraumatic. Eyes:      General: No scleral icterus. Conjunctiva/sclera: Conjunctivae normal.      Pupils: Pupils are equal, round, and reactive to light. Cardiovascular:      Rate and Rhythm: Normal rate and regular rhythm. Heart sounds: No murmur heard. No gallop. Pulmonary:      Effort: Pulmonary effort is normal. No respiratory distress. Breath sounds: No stridor. No wheezing or rales. Abdominal:      General: Bowel sounds are normal. There is no distension. Palpations: Abdomen is soft. There is no mass. Tenderness: There is no abdominal tenderness. There is no guarding or rebound. Musculoskeletal:         General: Tenderness present. Normal range of motion.       Cervical back: Normal range of motion and neck supple. Comments: TTP over right greater trochanter. FROM right hip flexion and extension, limited to external rotation. Negative SLR. Lower back NTTP. No visible skin rash or TTP. Lymphadenopathy:      Cervical: No cervical adenopathy. Skin:     General: Skin is warm and dry. Findings: No erythema or rash. Neurological:      Mental Status: She is alert and oriented to person, place, and time. Cranial Nerves: No cranial nerve deficit. Coordination: Coordination normal.         Medical Decision Making   I am the first provider for this patient. I reviewed the vital signs, available nursing notes, past medical history, past surgical history, family history and social history. Old Medical Records: Recent PCP note     Provider Notes:   DDX: Sciatica, trochanteric bursitis, shingles     ED Course:  11:29 AM   Initial assessment performed. The patients presenting problems have been discussed, and they are in agreement with the care plan formulated and outlined with them. I have encouraged them to ask questions as they arise throughout their visit. Progress Notes:  11:34 AM  Pt's sx and exam are most compatible with trochanteric bursitis. Will have her stop PO Toradol, increase dose of prednisone, and Rx lidocaine patches. F/U Ortho if no better 3-4 days for possible bursal steroid injection. Ree Read MD      Procedures:   Procedures    Diagnostic Study Results   Labs -    No results found for this or any previous visit (from the past 12 hour(s)). Radiologic Studies -  The following have been ordered and reviewed:  No orders to display     CT Results  (Last 48 hours)    None        CXR Results  (Last 48 hours)    None            Vital Signs-Reviewed the patient's vital signs.    Patient Vitals for the past 12 hrs:   Temp Pulse Resp BP SpO2   07/10/21 1114 98.4 °F (36.9 °C) 69 18 (!) 180/95 98 %       Medications Given in the ED:  Medications - No data to display    Diagnosis:  Clinical Impression:   1. Greater trochanteric bursitis of right hip         Plan:  1:   Follow-up Information     Follow up With Specialties Details Why Contact Info    Saint Mays, MD Orthopedic Surgery In 4 days If symptoms worsen [address] [place] [address] [address]  [phone]            2:   Current Discharge Medication List      START taking these medications    Details   predniSONE (STERAPRED DS) 10 mg dose pack As directed  Qty: 21 Tablet, Refills: 0  Start date: 7/10/2021      lidocaine (Lidoderm) 5 % Apply patch to the affected area for 12 hours a day and remove for 12 hours a day. Qty: 5 Each, Refills: 0  Start date: 7/10/2021           Return to ED if worse. Disposition:  Home  _______________________________   Attestations: This note was performed by Rosemary Vazquez MD in its entirety.   _______________________________

## 2021-07-16 ENCOUNTER — HOSPITAL ENCOUNTER (OUTPATIENT)
Dept: GENERAL RADIOLOGY | Age: 76
Discharge: HOME | End: 2021-07-16
Payer: MEDICARE

## 2021-07-16 ENCOUNTER — TRANSCRIBE ORDER (OUTPATIENT)
Dept: REGISTRATION | Age: 76
End: 2021-07-16

## 2021-07-16 DIAGNOSIS — M25.551 RIGHT HIP PAIN: ICD-10-CM

## 2021-07-16 DIAGNOSIS — M25.551 RIGHT HIP PAIN: Primary | ICD-10-CM

## 2021-07-16 PROCEDURE — 73502 X-RAY EXAM HIP UNI 2-3 VIEWS: CPT

## 2021-11-22 ENCOUNTER — TELEPHONE (OUTPATIENT)
Dept: FAMILY MEDICINE CLINIC | Age: 76
End: 2021-11-22

## 2021-11-22 NOTE — TELEPHONE ENCOUNTER
Patient is questioning if she needs to keep taking Levocetirizine 5mg. It is costing her $55. She thought it was given to her for her allergies back this summer.

## 2021-11-28 ENCOUNTER — HOSPITAL ENCOUNTER (EMERGENCY)
Age: 76
Discharge: HOME | End: 2021-11-28
Attending: EMERGENCY MEDICINE
Payer: MEDICARE

## 2021-11-28 VITALS
HEART RATE: 83 BPM | DIASTOLIC BLOOD PRESSURE: 68 MMHG | OXYGEN SATURATION: 97 % | TEMPERATURE: 98.9 F | RESPIRATION RATE: 16 BRPM | SYSTOLIC BLOOD PRESSURE: 111 MMHG

## 2021-11-28 DIAGNOSIS — J01.00 ACUTE NON-RECURRENT MAXILLARY SINUSITIS: Primary | ICD-10-CM

## 2021-11-28 DIAGNOSIS — U07.1 COVID-19: ICD-10-CM

## 2021-11-28 PROCEDURE — 99283 EMERGENCY DEPT VISIT LOW MDM: CPT

## 2021-11-28 RX ORDER — AMOXICILLIN AND CLAVULANATE POTASSIUM 875; 125 MG/1; MG/1
1 TABLET, FILM COATED ORAL 2 TIMES DAILY
Qty: 14 TABLET | Refills: 0 | Status: SHIPPED | OUTPATIENT
Start: 2021-11-28 | End: 2021-12-08

## 2021-11-28 NOTE — ED PROVIDER NOTES
EMERGENCY DEPARTMENT HISTORY AND PHYSICAL EXAM          Date: 11/28/2021  Patient Name: Angle Fraire    History of Presenting Illness     Chief Complaint   Patient presents with    Sinus Infection       History Provided By: Patient    HPI: Angle Fraire is a 78 y.o. female, pmhx listed below, who presents to the ED c/o sinus pressure. Patient reports she has been feeling upper respiratory symptoms and ill for the last 2 weeks. Approximately 1 week ago, she did an at home test and was found to be Covid positive. Reports she had some chills last week on Monday and Wednesday but otherwise has been having gradually worsening sinus pressure. Reports sinus drainage and bilateral ear pain. Reports pain with pressure on sinuses. No cough, fever, shortness of breath. History of sinus infections in the past.        PCP: Herberth Pereyra NP    There are no other complaints, changes, or physical findings at this time. Past History       Past Medical History:  Past Medical History:   Diagnosis Date    Anxiety and depression     Chronic headaches . vascular    Chronic pancreatitis ([address] 12/24/2013    Depression     Duodenal ulcer     GERD (gastroesophageal reflux disease)     Hypertension     IBS (irritable bowel syndrome)     Menopause     Onychomycosis     Pancreatitis     Recurrent sinusitis     S/P cardiac cath 3/4/2014    3/3/14 1st diagonal: 60 %  proximal -- Mid RCA: There was a 55 % stenosis.  Sciatica     reccurrent       Past Surgical History:  Past Surgical History:   Procedure Laterality Date    HX APPENDECTOMY      HX CHOLECYSTECTOMY      [place] ABDOMEN SURGERY PROC UNLISTED         Family History:  Family History   Problem Relation Age of Onset    Heart Disease Mother     Heart Disease Father        Social History:  Social History     Tobacco Use    Smoking status: Never Smoker    Smokeless tobacco: Never Used   Substance Use Topics    Alcohol use:  No Alcohol/week: 0.0 standard drinks    Drug use: No       Current Outpatient Medications   Medication Sig Dispense Refill    amoxicillin-clavulanate (Augmentin) 875-125 mg per tablet Take 1 Tablet by mouth two (2) times a day. 14 Tablet 0    predniSONE (STERAPRED DS) 10 mg dose pack As directed 21 Tablet 0    lidocaine (Lidoderm) 5 % Apply patch to the affected area for 12 hours a day and remove for 12 hours a day. 5 Each 0    levocetirizine (XYZAL) 5 mg tablet Take 1 Tablet by mouth daily. 90 Tablet 3    levothyroxine (SYNTHROID) 88 mcg tablet Take 1 Tablet by mouth Daily (before breakfast). 90 Tablet 3    irbesartan-hydroCHLOROthiazide (AVALIDE) 300-12.5 mg per tablet TAKE 1 TABLET EVERY DAY FOR PRESSURE and heart 90 Tab 3    pravastatin (PRAVACHOL) 40 mg tablet Take 1 Tab by mouth nightly. Indications: heart and cholesterol 90 Tab 3    amLODIPine (NORVASC) 5 mg tablet TAKE 1 TABLET EVERY DAY for pressure and heart 90 Tab 3    omeprazole (PRILOSEC) 40 mg capsule TAKE 1 CAPSULE EVERY DAY for stomach 90 Cap 3    carvediloL (COREG) 25 mg tablet TAKE 1 TABLET TWICE DAILY WITH MEALS FOR PRESSURE AND HEART 180 Tab 3    betamethasone dipropionate (DIPROLENE) 0.05 % ointment Apply  to affected area two (2) times a day. 45 g 1    cyanocobalamin, vitamin B-12, 5,000 mcg TbDi Take 1 Tab by mouth daily.  aspirin 81 mg chewable tablet Take 1 Tab by mouth daily. 100 Tab 3    nitroglycerin (NITROSTAT) 0.4 mg SL tablet 1 Tab by SubLINGual route every five (5) minutes as needed for Chest Pain (call 911 if not relieved by 3). 25 Tab 1    cholecalciferol, VITAMIN D3, (VITAMIN D3) 5,000 unit tab tablet Take 1 Tab by mouth daily. 90 Tab 3       Allergies: Allergies   Allergen Reactions    Nitrate Analogues Other (comments)     Severe headache    Bactrim [Sulfamethoprim] Rash         Review of Systems   Review of Systems   Constitutional: Positive for chills. Negative for fever.    HENT: Positive for congestion, sinus pressure and sinus pain. Eyes: Negative for pain. Respiratory: Negative for shortness of breath. Cardiovascular: Negative for chest pain. Gastrointestinal: Negative for abdominal pain. Genitourinary: Negative for flank pain. Musculoskeletal: Negative for back pain. Neurological: Negative for headaches. Psychiatric/Behavioral: Negative for agitation. Physical Exam     Vital Signs-Reviewed the patient's vital signs. Patient Vitals for the past 12 hrs:   Temp Pulse Resp BP SpO2   11/28/21 1209 98.9 °F (37.2 °C) 83 16 111/68 97 %       Physical Exam  Constitutional:       Appearance: Normal appearance. HENT:      Head: Normocephalic and atraumatic. Comments: Bilateral sinus tenderness, right maxillary more tender than other locations. No facial swelling. Oropharynx clear. Mouth/Throat:      Mouth: Mucous membranes are moist.   Eyes:      Pupils: Pupils are equal, round, and reactive to light. Cardiovascular:      Rate and Rhythm: Normal rate and regular rhythm. Pulmonary:      Effort: Pulmonary effort is normal.      Breath sounds: Normal breath sounds. No wheezing, rhonchi or rales. Musculoskeletal:         General: No swelling. Skin:     General: Skin is warm and dry. Neurological:      Mental Status: She is alert and oriented to person, place, and time. Psychiatric:         Mood and Affect: Mood normal.         Diagnostic Study Results     Labs -   No results found for this or any previous visit (from the past 12 hour(s)). Radiologic Studies -   No orders to display     CT Results  (Last 48 hours)    None        CXR Results  (Last 48 hours)    None          Medical Decision Making   I am the first provider for this patient. I reviewed the vital signs, available nursing notes, past medical history, past surgical history, family history and social history.     Records Reviewed: Nursing Notes and Old Medical Records    Provider Notes (Medical Decision Making): MDM: 72-year-old female with diagnosis of COVID-19 last week. Now with worsening sinus symptoms. Concern for bacterial infection on top of COVID-19. O2 sat okay, lung sounds clear. Will treat with Augmentin for sinuses and recommend continued nasal decongestants and good p.o. hydration. Will follow up as instructed. All questions have been answered, pt voiced understanding and agreement with plan. Specific return precautions provided as well as instructions to return to the ED should sx worsen at any time. Vital signs stable for discharge. Diagnosis     Clinical Impression:   1. Acute non-recurrent maxillary sinusitis    2. COVID-19            Disposition:  Discharged    Discharge Medication List as of 11/28/2021  1:27 PM      START taking these medications    Details   amoxicillin-clavulanate (Augmentin) 875-125 mg per tablet Take 1 Tablet by mouth two (2) times a day., Normal, Disp-14 Tablet, R-0         CONTINUE these medications which have NOT CHANGED    Details   predniSONE (STERAPRED DS) 10 mg dose pack As directed, Normal, Disp-21 Tablet, R-0      lidocaine (Lidoderm) 5 % Apply patch to the affected area for 12 hours a day and remove for 12 hours a day., Normal, Disp-5 Each, R-0      levocetirizine (XYZAL) 5 mg tablet Take 1 Tablet by mouth daily. , Normal, Disp-90 Tablet, R-3      levothyroxine (SYNTHROID) 88 mcg tablet Take 1 Tablet by mouth Daily (before breakfast). , Normal, Disp-90 Tablet, R-3      irbesartan-hydroCHLOROthiazide (AVALIDE) 300-12.5 mg per tablet TAKE 1 TABLET EVERY DAY FOR PRESSURE and heart, Normal, Disp-90 Tab, R-3      pravastatin (PRAVACHOL) 40 mg tablet Take 1 Tab by mouth nightly.  Indications: heart and cholesterol, Normal, Disp-90 Tab, R-3      amLODIPine (NORVASC) 5 mg tablet TAKE 1 TABLET EVERY DAY for pressure and heart, Normal, Disp-90 Tab, R-3      omeprazole (PRILOSEC) 40 mg capsule TAKE 1 CAPSULE EVERY DAY for stomach, Normal, Disp-90 Cap, R-3 carvediloL (COREG) 25 mg tablet TAKE 1 TABLET TWICE DAILY WITH MEALS FOR PRESSURE AND HEART, Normal, Disp-180 Tab, R-3      betamethasone dipropionate (DIPROLENE) 0.05 % ointment Apply  to affected area two (2) times a day., Normal, Disp-45 g,R-1      cyanocobalamin, vitamin B-12, 5,000 mcg TbDi Take 1 Tab by mouth daily. , Historical Med      aspirin 81 mg chewable tablet Take 1 Tab by mouth daily. , Normal, Disp-100 Tab, R-3      nitroglycerin (NITROSTAT) 0.4 mg SL tablet 1 Tab by SubLINGual route every five (5) minutes as needed for Chest Pain (call 911 if not relieved by 3). , Normal, Disp-25 Tab, R-1      cholecalciferol, VITAMIN D3, (VITAMIN D3) 5,000 unit tab tablet Take 1 Tab by mouth daily. , Mail Order, Disp-90 Tab, R-3               Please note, this dictation was completed with [employer], the [employer] voice recognition software. Quite often unanticipated grammatical, syntax, homophones, and other interpretive errors are inadvertently transcribed by the computer software. Please disregard these errors. Please excuse any errors that have escaped final proof reading.

## 2021-12-03 ENCOUNTER — TELEPHONE (OUTPATIENT)
Dept: FAMILY MEDICINE CLINIC | Age: 76
End: 2021-12-03

## 2021-12-03 NOTE — TELEPHONE ENCOUNTER
Pt went to er for sick visit . Pt got a antibiotic and has 3 days left. Pt ear is still hurting . please advise

## 2021-12-03 NOTE — TELEPHONE ENCOUNTER
She was also COVID positive. She needs to give the antibiotic more time. I also recommend she use OTC Flonase and if she still has her Xyzal (levocetirizine) at home, to restart that. She can reach back out to us next week.

## 2021-12-06 ENCOUNTER — APPOINTMENT (OUTPATIENT)
Dept: GENERAL RADIOLOGY | Age: 76
End: 2021-12-06
Attending: EMERGENCY MEDICINE
Payer: MEDICARE

## 2021-12-06 ENCOUNTER — HOSPITAL ENCOUNTER (EMERGENCY)
Age: 76
Discharge: ANOTHER HOSPITAL | End: 2021-12-06
Attending: EMERGENCY MEDICINE
Payer: MEDICARE

## 2021-12-06 ENCOUNTER — HOSPITAL ENCOUNTER (INPATIENT)
Age: 76
LOS: 2 days | Discharge: HOME | DRG: 313 | End: 2021-12-08
Attending: EMERGENCY MEDICINE | Admitting: FAMILY MEDICINE
Payer: MEDICARE

## 2021-12-06 VITALS
SYSTOLIC BLOOD PRESSURE: 131 MMHG | HEIGHT: 67 IN | TEMPERATURE: 98 F | DIASTOLIC BLOOD PRESSURE: 59 MMHG | WEIGHT: 190 LBS | RESPIRATION RATE: 18 BRPM | HEART RATE: 75 BPM | OXYGEN SATURATION: 98 % | BODY MASS INDEX: 29.82 KG/M2

## 2021-12-06 DIAGNOSIS — R07.9 CHEST PAIN, UNSPECIFIED TYPE: ICD-10-CM

## 2021-12-06 DIAGNOSIS — R07.9 CHEST PAIN, UNSPECIFIED TYPE: Primary | ICD-10-CM

## 2021-12-06 DIAGNOSIS — I21.4 NSTEMI (NON-ST ELEVATED MYOCARDIAL INFARCTION) (HCC): Primary | ICD-10-CM

## 2021-12-06 PROBLEM — I16.0 HYPERTENSIVE URGENCY: Status: ACTIVE | Noted: 2021-12-06

## 2021-12-06 LAB
ALBUMIN SERPL-MCNC: 3.6 G/DL (ref 3.5–5)
ALBUMIN SERPL-MCNC: 3.6 G/DL (ref 3.5–5)
ALBUMIN/GLOB SERPL: 1.1 {RATIO} (ref 1.1–2.2)
ALBUMIN/GLOB SERPL: 1.2 {RATIO} (ref 1.1–2.2)
ALP SERPL-CCNC: 60 U/L (ref 45–117)
ALP SERPL-CCNC: 77 U/L (ref 45–117)
ALT SERPL-CCNC: 30 U/L (ref 12–78)
ALT SERPL-CCNC: 72 U/L (ref 12–78)
ANION GAP SERPL CALC-SCNC: 7 MMOL/L (ref 5–15)
ANION GAP SERPL CALC-SCNC: 9 MMOL/L (ref 5–15)
APPEARANCE UR: CLEAR
AST SERPL-CCNC: 100 U/L (ref 15–37)
AST SERPL-CCNC: 22 U/L (ref 15–37)
BACTERIA URNS QL MICRO: NEGATIVE /HPF
BASOPHILS # BLD: 0 K/UL (ref 0–0.1)
BASOPHILS # BLD: 0.1 K/UL (ref 0–0.1)
BASOPHILS NFR BLD: 0 % (ref 0–1)
BASOPHILS NFR BLD: 1 % (ref 0–1)
BILIRUB SERPL-MCNC: 0.3 MG/DL (ref 0.2–1)
BILIRUB SERPL-MCNC: 0.4 MG/DL (ref 0.2–1)
BILIRUB UR QL: NEGATIVE
BUN SERPL-MCNC: 16 MG/DL (ref 6–20)
BUN SERPL-MCNC: 18 MG/DL (ref 6–20)
BUN/CREAT SERPL: 13 (ref 12–20)
BUN/CREAT SERPL: 13 (ref 12–20)
CALCIUM SERPL-MCNC: 8.9 MG/DL (ref 8.5–10.1)
CALCIUM SERPL-MCNC: 9.5 MG/DL (ref 8.5–10.1)
CHLORIDE SERPL-SCNC: 101 MMOL/L (ref 97–108)
CHLORIDE SERPL-SCNC: 104 MMOL/L (ref 97–108)
CO2 SERPL-SCNC: 28 MMOL/L (ref 21–32)
CO2 SERPL-SCNC: 30 MMOL/L (ref 21–32)
COLOR UR: NORMAL
COMMENT, HOLDF: NORMAL
COMMENT, HOLDF: NORMAL
CREAT SERPL-MCNC: 1.24 MG/DL (ref 0.55–1.02)
CREAT SERPL-MCNC: 1.4 MG/DL (ref 0.55–1.02)
DIFFERENTIAL METHOD BLD: ABNORMAL
DIFFERENTIAL METHOD BLD: NORMAL
EOSINOPHIL # BLD: 0.2 K/UL (ref 0–0.4)
EOSINOPHIL # BLD: 0.4 K/UL (ref 0–0.4)
EOSINOPHIL NFR BLD: 3 % (ref 0–7)
EOSINOPHIL NFR BLD: 9 % (ref 0–7)
EPITH CASTS URNS QL MICRO: NORMAL /LPF
ERYTHROCYTE [DISTWIDTH] IN BLOOD BY AUTOMATED COUNT: 12.6 % (ref 11.5–14.5)
ERYTHROCYTE [DISTWIDTH] IN BLOOD BY AUTOMATED COUNT: 12.7 % (ref 11.5–14.5)
FLUAV RNA SPEC QL NAA+PROBE: NOT DETECTED
FLUBV RNA SPEC QL NAA+PROBE: NOT DETECTED
GLOBULIN SER CALC-MCNC: 3 G/DL (ref 2–4)
GLOBULIN SER CALC-MCNC: 3.3 G/DL (ref 2–4)
GLUCOSE SERPL-MCNC: 127 MG/DL (ref 65–100)
GLUCOSE SERPL-MCNC: 128 MG/DL (ref 65–100)
GLUCOSE UR STRIP.AUTO-MCNC: NEGATIVE MG/DL
HCT VFR BLD AUTO: 36.9 % (ref 35–47)
HCT VFR BLD AUTO: 38.7 % (ref 35–47)
HGB BLD-MCNC: 12.6 G/DL (ref 11.5–16)
HGB BLD-MCNC: 13.2 G/DL (ref 11.5–16)
HGB UR QL STRIP: NEGATIVE
IMM GRANULOCYTES # BLD AUTO: 0 K/UL (ref 0–0.04)
IMM GRANULOCYTES # BLD AUTO: 0 K/UL (ref 0–0.04)
IMM GRANULOCYTES NFR BLD AUTO: 0 % (ref 0–0.5)
IMM GRANULOCYTES NFR BLD AUTO: 0 % (ref 0–0.5)
KETONES UR QL STRIP.AUTO: NEGATIVE MG/DL
LEUKOCYTE ESTERASE UR QL STRIP.AUTO: NEGATIVE
LYMPHOCYTES # BLD: 1.1 K/UL (ref 0.8–3.5)
LYMPHOCYTES # BLD: 1.2 K/UL (ref 0.8–3.5)
LYMPHOCYTES NFR BLD: 15 % (ref 12–49)
LYMPHOCYTES NFR BLD: 28 % (ref 12–49)
MCH RBC QN AUTO: 31 PG (ref 26–34)
MCH RBC QN AUTO: 31.8 PG (ref 26–34)
MCHC RBC AUTO-ENTMCNC: 34.1 G/DL (ref 30–36.5)
MCHC RBC AUTO-ENTMCNC: 34.1 G/DL (ref 30–36.5)
MCV RBC AUTO: 90.8 FL (ref 80–99)
MCV RBC AUTO: 93.2 FL (ref 80–99)
MONOCYTES # BLD: 0.5 K/UL (ref 0–1)
MONOCYTES # BLD: 0.5 K/UL (ref 0–1)
MONOCYTES NFR BLD: 11 % (ref 5–13)
MONOCYTES NFR BLD: 8 % (ref 5–13)
NEUTS SEG # BLD: 2.2 K/UL (ref 1.8–8)
NEUTS SEG # BLD: 5.1 K/UL (ref 1.8–8)
NEUTS SEG NFR BLD: 51 % (ref 32–75)
NEUTS SEG NFR BLD: 74 % (ref 32–75)
NITRITE UR QL STRIP.AUTO: NEGATIVE
NRBC # BLD: 0 K/UL (ref 0–0.01)
NRBC # BLD: 0 K/UL (ref 0–0.01)
NRBC BLD-RTO: 0 PER 100 WBC
NRBC BLD-RTO: 0 PER 100 WBC
PH UR STRIP: 6 [PH] (ref 5–8)
PLATELET # BLD AUTO: 207 K/UL (ref 150–400)
PLATELET # BLD AUTO: 230 K/UL (ref 150–400)
PMV BLD AUTO: 10.6 FL (ref 8.9–12.9)
PMV BLD AUTO: 10.9 FL (ref 8.9–12.9)
POTASSIUM SERPL-SCNC: 3.6 MMOL/L (ref 3.5–5.1)
POTASSIUM SERPL-SCNC: 4.3 MMOL/L (ref 3.5–5.1)
PROT SERPL-MCNC: 6.6 G/DL (ref 6.4–8.2)
PROT SERPL-MCNC: 6.9 G/DL (ref 6.4–8.2)
PROT UR STRIP-MCNC: NEGATIVE MG/DL
RBC # BLD AUTO: 3.96 M/UL (ref 3.8–5.2)
RBC # BLD AUTO: 4.26 M/UL (ref 3.8–5.2)
RBC #/AREA URNS HPF: NORMAL /HPF (ref 0–5)
SAMPLES BEING HELD,HOLD: NORMAL
SAMPLES BEING HELD,HOLD: NORMAL
SARS-COV-2, COV2: DETECTED
SODIUM SERPL-SCNC: 138 MMOL/L (ref 136–145)
SODIUM SERPL-SCNC: 141 MMOL/L (ref 136–145)
SP GR UR REFRACTOMETRY: 1.01 (ref 1–1.03)
TROPONIN-HIGH SENSITIVITY: 32 NG/L (ref 0–51)
TROPONIN-HIGH SENSITIVITY: 708 NG/L (ref 0–51)
TROPONIN-HIGH SENSITIVITY: 8 NG/L (ref 0–51)
UROBILINOGEN UR QL STRIP.AUTO: 0.2 EU/DL (ref 0.2–1)
WBC # BLD AUTO: 4.3 K/UL (ref 3.6–11)
WBC # BLD AUTO: 6.9 K/UL (ref 3.6–11)
WBC URNS QL MICRO: NORMAL /HPF (ref 0–4)

## 2021-12-06 PROCEDURE — 84484 ASSAY OF TROPONIN QUANT: CPT

## 2021-12-06 PROCEDURE — 96374 THER/PROPH/DIAG INJ IV PUSH: CPT

## 2021-12-06 PROCEDURE — 99285 EMERGENCY DEPT VISIT HI MDM: CPT

## 2021-12-06 PROCEDURE — 65270000029 HC RM PRIVATE

## 2021-12-06 PROCEDURE — 74011250637 HC RX REV CODE- 250/637

## 2021-12-06 PROCEDURE — 80053 COMPREHEN METABOLIC PANEL: CPT

## 2021-12-06 PROCEDURE — 93005 ELECTROCARDIOGRAM TRACING: CPT

## 2021-12-06 PROCEDURE — 87636 SARSCOV2 & INF A&B AMP PRB: CPT

## 2021-12-06 PROCEDURE — 71045 X-RAY EXAM CHEST 1 VIEW: CPT

## 2021-12-06 PROCEDURE — 74011000250 HC RX REV CODE- 250: Performed by: EMERGENCY MEDICINE

## 2021-12-06 PROCEDURE — 96375 TX/PRO/DX INJ NEW DRUG ADDON: CPT

## 2021-12-06 PROCEDURE — 74011250636 HC RX REV CODE- 250/636: Performed by: EMERGENCY MEDICINE

## 2021-12-06 PROCEDURE — 74011250637 HC RX REV CODE- 250/637: Performed by: EMERGENCY MEDICINE

## 2021-12-06 PROCEDURE — 36415 COLL VENOUS BLD VENIPUNCTURE: CPT

## 2021-12-06 PROCEDURE — 85025 COMPLETE CBC W/AUTO DIFF WBC: CPT

## 2021-12-06 PROCEDURE — 81001 URINALYSIS AUTO W/SCOPE: CPT

## 2021-12-06 RX ORDER — ONDANSETRON 2 MG/ML
4 INJECTION INTRAMUSCULAR; INTRAVENOUS
Status: DISCONTINUED | OUTPATIENT
Start: 2021-12-06 | End: 2021-12-08 | Stop reason: HOSPADM

## 2021-12-06 RX ORDER — ACETAMINOPHEN 650 MG/1
650 SUPPOSITORY RECTAL
Status: DISCONTINUED | OUTPATIENT
Start: 2021-12-06 | End: 2021-12-08 | Stop reason: HOSPADM

## 2021-12-06 RX ORDER — MORPHINE SULFATE 4 MG/ML
4 INJECTION INTRAVENOUS
Status: COMPLETED | OUTPATIENT
Start: 2021-12-06 | End: 2021-12-06

## 2021-12-06 RX ORDER — SODIUM CHLORIDE 0.9 % (FLUSH) 0.9 %
5-40 SYRINGE (ML) INJECTION AS NEEDED
Status: DISCONTINUED | OUTPATIENT
Start: 2021-12-06 | End: 2021-12-08 | Stop reason: HOSPADM

## 2021-12-06 RX ORDER — AMLODIPINE BESYLATE 5 MG/1
5 TABLET ORAL ONCE
Status: COMPLETED | OUTPATIENT
Start: 2021-12-06 | End: 2021-12-06

## 2021-12-06 RX ORDER — HEPARIN SODIUM 10000 [USP'U]/100ML
11-25 INJECTION, SOLUTION INTRAVENOUS
Status: DISCONTINUED | OUTPATIENT
Start: 2021-12-06 | End: 2021-12-07

## 2021-12-06 RX ORDER — POLYETHYLENE GLYCOL 3350 17 G/17G
17 POWDER, FOR SOLUTION ORAL DAILY PRN
Status: DISCONTINUED | OUTPATIENT
Start: 2021-12-06 | End: 2021-12-08 | Stop reason: HOSPADM

## 2021-12-06 RX ORDER — HEPARIN SODIUM 1000 [USP'U]/ML
4000 INJECTION, SOLUTION INTRAVENOUS; SUBCUTANEOUS ONCE
Status: COMPLETED | OUTPATIENT
Start: 2021-12-06 | End: 2021-12-07

## 2021-12-06 RX ORDER — GUAIFENESIN 100 MG/5ML
LIQUID (ML) ORAL
Status: COMPLETED
Start: 2021-12-06 | End: 2021-12-06

## 2021-12-06 RX ORDER — SODIUM CHLORIDE 0.9 % (FLUSH) 0.9 %
5-40 SYRINGE (ML) INJECTION EVERY 8 HOURS
Status: DISCONTINUED | OUTPATIENT
Start: 2021-12-06 | End: 2021-12-08 | Stop reason: HOSPADM

## 2021-12-06 RX ORDER — ONDANSETRON 2 MG/ML
4 INJECTION INTRAMUSCULAR; INTRAVENOUS ONCE
Status: COMPLETED | OUTPATIENT
Start: 2021-12-06 | End: 2021-12-06

## 2021-12-06 RX ORDER — GUAIFENESIN 100 MG/5ML
162 LIQUID (ML) ORAL DAILY
Status: DISCONTINUED | OUTPATIENT
Start: 2021-12-07 | End: 2021-12-07

## 2021-12-06 RX ORDER — GUAIFENESIN 100 MG/5ML
81 LIQUID (ML) ORAL
Status: COMPLETED | OUTPATIENT
Start: 2021-12-06 | End: 2021-12-06

## 2021-12-06 RX ORDER — ONDANSETRON 4 MG/1
4 TABLET, ORALLY DISINTEGRATING ORAL
Status: DISCONTINUED | OUTPATIENT
Start: 2021-12-06 | End: 2021-12-08 | Stop reason: HOSPADM

## 2021-12-06 RX ORDER — ACETAMINOPHEN 325 MG/1
650 TABLET ORAL
Status: DISCONTINUED | OUTPATIENT
Start: 2021-12-06 | End: 2021-12-08 | Stop reason: HOSPADM

## 2021-12-06 RX ORDER — CARVEDILOL 6.25 MG/1
25 TABLET ORAL ONCE
Status: COMPLETED | OUTPATIENT
Start: 2021-12-06 | End: 2021-12-06

## 2021-12-06 RX ORDER — GUAIFENESIN 100 MG/5ML
162 LIQUID (ML) ORAL
Status: COMPLETED | OUTPATIENT
Start: 2021-12-06 | End: 2021-12-06

## 2021-12-06 RX ADMIN — MORPHINE SULFATE 4 MG: 4 INJECTION INTRAVENOUS at 10:53

## 2021-12-06 RX ADMIN — CARVEDILOL 25 MG: 6.25 TABLET, FILM COATED ORAL at 08:59

## 2021-12-06 RX ADMIN — Medication 81 MG: at 20:18

## 2021-12-06 RX ADMIN — ONDANSETRON 4 MG: 2 INJECTION INTRAMUSCULAR; INTRAVENOUS at 10:53

## 2021-12-06 RX ADMIN — LIDOCAINE HYDROCHLORIDE 40 ML: 20 SOLUTION ORAL; TOPICAL at 11:49

## 2021-12-06 RX ADMIN — ASPIRIN 81 MG CHEWABLE TABLET 162 MG: 81 TABLET CHEWABLE at 11:49

## 2021-12-06 RX ADMIN — ASPIRIN 81 MG CHEWABLE TABLET 81 MG: 81 TABLET CHEWABLE at 20:18

## 2021-12-06 RX ADMIN — AMLODIPINE BESYLATE 5 MG: 5 TABLET ORAL at 08:59

## 2021-12-06 NOTE — ROUTINE PROCESS
Writer contacted [employer] re: will-call  ALS transport to [facility]. Spoke to Alminder. Demographics provided as requested (name, , ht/wt, DX, CM, O2, IV).

## 2021-12-06 NOTE — ED NOTES
TRANSFER - OUT REPORT:    Verbal report given to KAJAL Johnson RN on Corina Neil  being transferred to [facility] ED for routine progression of care       Report consisted of patients Situation, Background, Assessment and   Recommendations(SBAR). Information from the following report(s) SBAR, Kardex, ED Summary, MAR, Recent Results and Med Rec Status was reviewed with the receiving nurse. Lines:   Peripheral IV 12/06/21 Right Antecubital (Active)   Site Assessment Clean, dry, & intact 12/06/21 1052   Phlebitis Assessment 0 12/06/21 1052   Infiltration Assessment 0 12/06/21 1052   Dressing Status Clean, dry, & intact 12/06/21 1052        Opportunity for questions and clarification was provided.       Patient transported with:   [facility]

## 2021-12-06 NOTE — ROUTINE PROCESS
Writer contacted [employer] re: ALS transport to [facility] ED. Spoke to Florence. Updated COVID+. ETA 1600. ED notified.

## 2021-12-06 NOTE — ED TRIAGE NOTES
Pt arrives via EMS from [facility] as a transfer for NSTEMI; elevated trop at [facility];

## 2021-12-06 NOTE — ED PROVIDER NOTES
EMERGENCY DEPARTMENT HISTORY AND PHYSICAL EXAM          Date: 12/6/2021  Patient Name: Ashkan Joseph    History of Presenting Illness     Chief Complaint   Patient presents with    Chest Pain       History Provided By: Patient    HPI: Ashkan Joseph is a 78 y.o. female, pmhx listed below, who presents to the ED c/o chest pressure. Patient reports she awakened this morning with chest pressure. Patient reports she had similar chest pressure 8 years ago associated with the death of her . Reports she is currently under high stress because her partner of the last 3-1/2 years is in critical condition at [facility] with COVID-19 and likely will pass away this week. She reports chest pain is substernal.  Nonradiating. She took aspirin 81 mg this morning prior to arrival, did not take any of her regular pressure medications or other meds. No SOB, nausea, diaphoresis. Reports she has a history of a cardiac cathetertization, no stents placed. Patient tested positive for Covid 2 weeks ago using at home rapid test.  Treated last week for possible bacterial sinus infection in addition to Covid. PCP: Ankur Bee NP    There are no other complaints, changes, or physical findings at this time. Past History       Past Medical History:  Past Medical History:   Diagnosis Date    Anxiety and depression     Chronic headaches . vascular    Chronic [address] [address] 12/24/2013    Depression     Duodenal ulcer     GERD (gastroesophageal reflux disease)     Hypertension     IBS (irritable bowel syndrome)     Menopause     Onychomycosis     Pancreatitis     Recurrent sinusitis     S/P cardiac cath 3/4/2014    3/3/14 1st diagonal: 60 %  proximal -- Mid RCA: There was a 55 % stenosis.        Sciatica     reccurrent       Past Surgical History:  Past Surgical History:   Procedure Laterality Date    HX APPENDECTOMY      HX CHOLECYSTECTOMY      [place] ABDOMEN SURGERY PROC UNLISTED         Family History:  Family History   Problem Relation Age of Onset    Heart Disease Mother     Heart Disease Father        Social History:  Social History     Tobacco Use    Smoking status: Never Smoker    Smokeless tobacco: Never Used   Substance Use Topics    Alcohol use: No     Alcohol/week: 0.0 standard drinks    Drug use: No       Current Outpatient Medications   Medication Sig Dispense Refill    amoxicillin-clavulanate (Augmentin) 875-125 mg per tablet Take 1 Tablet by mouth two (2) times a day. 14 Tablet 0    lidocaine (Lidoderm) 5 % Apply patch to the affected area for 12 hours a day and remove for 12 hours a day. 5 Each 0    levocetirizine (XYZAL) 5 mg tablet Take 1 Tablet by mouth daily. 90 Tablet 3    levothyroxine (SYNTHROID) 88 mcg tablet Take 1 Tablet by mouth Daily (before breakfast). 90 Tablet 3    irbesartan-hydroCHLOROthiazide (AVALIDE) 300-12.5 mg per tablet TAKE 1 TABLET EVERY DAY FOR PRESSURE and heart 90 Tab 3    pravastatin (PRAVACHOL) 40 mg tablet Take 1 Tab by mouth nightly. Indications: heart and cholesterol 90 Tab 3    amLODIPine (NORVASC) 5 mg tablet TAKE 1 TABLET EVERY DAY for pressure and heart 90 Tab 3    omeprazole (PRILOSEC) 40 mg capsule TAKE 1 CAPSULE EVERY DAY for stomach 90 Cap 3    carvediloL (COREG) 25 mg tablet TAKE 1 TABLET TWICE DAILY WITH MEALS FOR PRESSURE AND HEART 180 Tab 3    betamethasone dipropionate (DIPROLENE) 0.05 % ointment Apply  to affected area two (2) times a day. 45 g 1    cyanocobalamin, vitamin B-12, 5,000 mcg TbDi Take 1 Tab by mouth daily.  aspirin 81 mg chewable tablet Take 1 Tab by mouth daily. 100 Tab 3    nitroglycerin (NITROSTAT) 0.4 mg SL tablet 1 Tab by SubLINGual route every five (5) minutes as needed for Chest Pain (call 911 if not relieved by 3). 25 Tab 1    cholecalciferol, VITAMIN D3, (VITAMIN D3) 5,000 unit tab tablet Take 1 Tab by mouth daily. 90 Tab 3       Allergies:   Allergies   Allergen Reactions    Nitrate Analogues Other (comments)     Severe headache    Bactrim [Sulfamethoprim] Rash         Review of Systems   Review of Systems   Constitutional: Negative for chills and fever. HENT: Negative for congestion. Eyes: Negative for pain. Respiratory: Negative for shortness of breath. Cardiovascular: Positive for chest pain. Gastrointestinal: Negative for abdominal pain. Genitourinary: Negative for flank pain. Musculoskeletal: Negative for back pain. Neurological: Negative for headaches. Psychiatric/Behavioral: Negative for agitation. Physical Exam     Vital Signs-Reviewed the patient's vital signs. Patient Vitals for the past 12 hrs:   Temp Pulse Resp BP SpO2   12/06/21 1615 -- -- -- (!) 92/57 97 %   12/06/21 1601 -- -- -- (!) 109/59 95 %   12/06/21 1545 -- -- -- 114/69 96 %   12/06/21 1531 -- -- -- 112/65 97 %   12/06/21 1516 -- 79 18 116/67 94 %   12/06/21 1500 -- 72 10 111/66 96 %   12/06/21 1446 -- 73 16 120/74 95 %   12/06/21 1437 -- 74 18 -- 96 %   12/06/21 1430 -- 76 20 -- 97 %   12/06/21 1418 -- 71 21 -- 98 %   12/06/21 1401 -- -- -- (!) 145/78 95 %   12/06/21 1347 -- -- -- 130/74 97 %   12/06/21 1331 -- -- -- (!) 123/58 97 %   12/06/21 1316 -- -- -- (!) 122/59 93 %   12/06/21 1258 -- 64 -- -- --   12/06/21 1257 -- -- -- -- 95 %   12/06/21 1248 -- -- -- -- 97 %   12/06/21 1245 -- -- -- (!) 150/73 96 %   12/06/21 1221 -- (!) 58 16 133/76 99 %   12/06/21 1147 -- 61 17 134/76 95 %   12/06/21 1054 -- 66 18 138/78 96 %   12/06/21 1039 -- 63 19 (!) 154/76 97 %   12/06/21 1003 -- 66 18 (!) 149/81 97 %   12/06/21 0939 -- 65 18 (!) 161/88 99 %   12/06/21 0933 -- 65 16 (!) 170/86 97 %   12/06/21 0913 -- 68 17 (!) 193/95 98 %   12/06/21 0857 -- 66 17 (!) 198/93 97 %   12/06/21 0847 -- 78 18 (!) 211/100 99 %   12/06/21 0830 98 °F (36.7 °C) 66 16 (!) 224/108 97 %       Physical Exam  Constitutional:       Appearance: Normal appearance.    HENT:      Head: Normocephalic and atraumatic. Mouth/Throat:      Mouth: Mucous membranes are moist.   Eyes:      Pupils: Pupils are equal, round, and reactive to light. Cardiovascular:      Rate and Rhythm: Normal rate and regular rhythm. Pulmonary:      Effort: Pulmonary effort is normal.      Breath sounds: Normal breath sounds. Chest:      Chest wall: No tenderness. Abdominal:      Tenderness: There is no abdominal tenderness. Musculoskeletal:         General: No swelling. Right lower leg: No edema. Left lower leg: No edema. Skin:     General: Skin is warm and dry. Neurological:      Mental Status: She is alert and oriented to person, place, and time. Psychiatric:         Mood and Affect: Mood normal.         Diagnostic Study Results     Labs -     Recent Results (from the past 12 hour(s))   CBC WITH AUTOMATED DIFF    Collection Time: 12/06/21  8:51 AM   Result Value Ref Range    WBC 4.3 3.6 - 11.0 K/uL    RBC 4.26 3.80 - 5.20 M/uL    HGB 13.2 11.5 - 16.0 g/dL    HCT 38.7 35.0 - 47.0 %    MCV 90.8 80.0 - 99.0 FL    MCH 31.0 26.0 - 34.0 PG    MCHC 34.1 30.0 - 36.5 g/dL    RDW 12.6 11.5 - 14.5 %    PLATELET 211 158 - 717 K/uL    MPV 10.6 8.9 - 12.9 FL    NRBC 0.0 0  WBC    ABSOLUTE NRBC 0.00 0.00 - 0.01 K/uL    NEUTROPHILS 51 32 - 75 %    LYMPHOCYTES 28 12 - 49 %    MONOCYTES 11 5 - 13 %    EOSINOPHILS 9 (H) 0 - 7 %    BASOPHILS 1 0 - 1 %    IMMATURE GRANULOCYTES 0 0.0 - 0.5 %    ABS. NEUTROPHILS 2.2 1.8 - 8.0 K/UL    ABS. LYMPHOCYTES 1.2 0.8 - 3.5 K/UL    ABS. MONOCYTES 0.5 0.0 - 1.0 K/UL    ABS. EOSINOPHILS 0.4 0.0 - 0.4 K/UL    ABS. BASOPHILS 0.1 0.0 - 0.1 K/UL    ABS. IMM.  GRANS. 0.0 0.00 - 0.04 K/UL    DF AUTOMATED     METABOLIC PANEL, COMPREHENSIVE    Collection Time: 12/06/21  8:51 AM   Result Value Ref Range    Sodium 138 136 - 145 mmol/L    Potassium 4.3 3.5 - 5.1 mmol/L    Chloride 101 97 - 108 mmol/L    CO2 28 21 - 32 mmol/L    Anion gap 9 5 - 15 mmol/L    Glucose 128 (H) 65 - 100 mg/dL    BUN 18 6 - 20 MG/DL    Creatinine 1.40 (H) 0.55 - 1.02 MG/DL    BUN/Creatinine ratio 13 12 - 20      GFR est AA 45 (L) >60 ml/min/1.73m2    GFR est non-AA 37 (L) >60 ml/min/1.73m2    Calcium 9.5 8.5 - 10.1 MG/DL    Bilirubin, total 0.4 0.2 - 1.0 MG/DL    ALT (SGPT) 30 12 - 78 U/L    AST (SGOT) 22 15 - 37 U/L    Alk. phosphatase 60 45 - 117 U/L    Protein, total 6.9 6.4 - 8.2 g/dL    Albumin 3.6 3.5 - 5.0 g/dL    Globulin 3.3 2.0 - 4.0 g/dL    A-G Ratio 1.1 1.1 - 2.2     TROPONIN-HIGH SENSITIVITY    Collection Time: 12/06/21  8:51 AM   Result Value Ref Range    Troponin-High Sensitivity 8 0 - 51 ng/L   SAMPLES BEING HELD    Collection Time: 12/06/21  8:51 AM   Result Value Ref Range    SAMPLES BEING HELD 1SST, 1RED, 1BLUE     COMMENT        Add-on orders for these samples will be processed based on acceptable specimen integrity and analyte stability, which may vary by analyte. TROPONIN-HIGH SENSITIVITY    Collection Time: 12/06/21 10:51 AM   Result Value Ref Range    Troponin-High Sensitivity 32 0 - 51 ng/L   COVID-19 WITH INFLUENZA A/B    Collection Time: 12/06/21 12:00 PM   Result Value Ref Range    SARS-CoV-2 Detected (AA) NOTD      Influenza A by PCR Not detected NOTD      Influenza B by PCR Not detected NOTD     URINALYSIS W/MICROSCOPIC    Collection Time: 12/06/21  2:10 PM   Result Value Ref Range    Color YELLOW/STRAW      Appearance CLEAR CLEAR      Specific gravity 1.010 1.003 - 1.030      pH (UA) 6.0 5.0 - 8.0      Protein Negative NEG mg/dL    Glucose Negative NEG mg/dL    Ketone Negative NEG mg/dL    Bilirubin Negative NEG      Blood Negative NEG      Urobilinogen 0.2 0.2 - 1.0 EU/dL    Nitrites Negative NEG      Leukocyte Esterase Negative NEG      WBC 0-4 0 - 4 /hpf    RBC 0-5 0 - 5 /hpf    Epithelial cells FEW FEW /lpf    Bacteria Negative NEG /hpf       Radiologic Studies -   XR CHEST SNGL V   Final Result      No acute findings.            CT Results  (Last 48 hours)    None        CXR Results  (Last 48 hours) 12/06/21 0858  XR CHEST SNGL V Final result    Impression:      No acute findings. Narrative:  EXAM:  XR CHEST SNGL V       INDICATION: Chest pain       COMPARISON: Chest radiograph 12/13/2019       TECHNIQUE: Upright portable chest AP view       FINDINGS:        Cardiomediastinal silhouette within normal limits. Lungs and pleural spaces are   grossly clear. EKG interpretation: (Preliminary)  Rhythm: sinus, no ST elevation, narrow QRS  This EKG was interpreted by ED Provider Julito Hernandez MD    Medical Decision Making   I am the first provider for this patient. I reviewed the vital signs, available nursing notes, past medical history, past surgical history, family history and social history. Records Reviewed: Old Medical Records, nursing notes    Provider Notes (Medical Decision Making):   MDM: 78 y.o. F with chest pain. EKG with no ST elevation and otherwise normal.  Will plan for chest x-ray and lab work now including a troponin. Pt did not take her BP meds this morning, will also give amlodipine and coreg now. Initial assessment performed. The patients presenting problems have been discussed, and they are in agreement with the care plan formulated and outlined with them. I have encouraged them to ask questions as they arise throughout their visit. PROGRESS NOTE:  ED Course as of 12/06/21 1629   Mon Dec 06, 2021   1048 Patient and daughter updated on results. Will plan for 2nd troponin now. [PV]   0626 Patient updated on 2nd trop with increased x4 from initiate value. Will plan for transfer. [PV]   0500 Patient accepted by Dr. Marcelo Gaviria. Will await bed assignment. [PV]   4989 Case discussed with Dr. Maira Kunz, ER. Pt can be transferred ED to ED. [PV]      ED Course User Index  [PV] Adriana Gudino MD        Discharge note: Transfer center updated on patient's Covid positive status.   Based on initial positive at home test, she may be past quarantine requirements. Will allow inpatient team at [facility] to determine status of isolation. No further issues while patient was in the emergency department. Transfer team in ER at 4:30 PM, patient will be transferred in stable condition. CRITICAL CARE NOTE :    4:30 PM      IMPENDING DETERIORATION -Cardiovascular    ASSOCIATED RISK FACTORS -pain    MANAGEMENT- Transfer    INTERPRETATION -  ECG    INTERVENTIONS -pain control    CASE REVIEW - Hospitalist/Intensivist    TREATMENT RESPONSE -Stable    PERFORMED BY - Self        NOTES   :      I have spent 60 minutes of critical care time involved in lab review, consultations with specialist, family decision- making, bedside attention and documentation. During this entire length of time I was immediately available to the patient . Sujey Mendoza MD            Diagnosis     Clinical Impression:   1. Chest pain, unspecified type            Disposition:      Current Discharge Medication List            Please note, this dictation was completed with [employer], the computer voice recognition software. Quite often unanticipated grammatical, syntax, homophones, and other interpretive errors are inadvertently transcribed by the computer software. Please disregard these errors. Please excuse any errors that have escaped final proof reading.

## 2021-12-06 NOTE — Clinical Note
Status[de-identified] INPATIENT [101]   Type of Bed: Telemetry [19]   Cardiac Monitoring Required?: Yes   Inpatient Hospitalization Certified Necessary for the Following Reasons: 3.  Patient receiving treatment that can only be provided in an inpatient setting (further clarification in H&P documentation)   Admitting Diagnosis: NSTEMI (non-ST elevated myocardial infarction) [facility]) [[id number]]   Admitting Physician: Delfina Keller [[id number]]   Attending Physician: Delfina Keller [[id number]]   Estimated Length of Stay: 3-4 Midnights   Discharge Plan[de-identified] Extended Care Facility (e.g. Adult Home, Nursing Home, etc.)

## 2021-12-07 LAB
ANION GAP SERPL CALC-SCNC: 10 MMOL/L (ref 5–15)
APTT PPP: 21.9 SEC (ref 22.1–31)
APTT PPP: 34.4 SEC (ref 22.1–31)
ATRIAL RATE: 59 BPM
ATRIAL RATE: 68 BPM
BASOPHILS # BLD: 0.1 K/UL (ref 0–0.1)
BASOPHILS NFR BLD: 1 % (ref 0–1)
BUN SERPL-MCNC: 16 MG/DL (ref 6–20)
BUN/CREAT SERPL: 13 (ref 12–20)
CALCIUM SERPL-MCNC: 9.3 MG/DL (ref 8.5–10.1)
CALCULATED P AXIS, ECG09: 62 DEGREES
CALCULATED P AXIS, ECG09: 66 DEGREES
CALCULATED R AXIS, ECG10: 67 DEGREES
CALCULATED R AXIS, ECG10: 76 DEGREES
CALCULATED T AXIS, ECG11: 52 DEGREES
CALCULATED T AXIS, ECG11: 65 DEGREES
CHLORIDE SERPL-SCNC: 104 MMOL/L (ref 97–108)
CO2 SERPL-SCNC: 26 MMOL/L (ref 21–32)
CREAT SERPL-MCNC: 1.19 MG/DL (ref 0.55–1.02)
CRP SERPL-MCNC: <0.29 MG/DL (ref 0–0.6)
DIAGNOSIS, 93000: NORMAL
DIAGNOSIS, 93000: NORMAL
DIFFERENTIAL METHOD BLD: NORMAL
EOSINOPHIL # BLD: 0.2 K/UL (ref 0–0.4)
EOSINOPHIL NFR BLD: 4 % (ref 0–7)
ERYTHROCYTE [DISTWIDTH] IN BLOOD BY AUTOMATED COUNT: 12.6 % (ref 11.5–14.5)
FERRITIN SERPL-MCNC: 144 NG/ML (ref 26–388)
FIBRINOGEN PPP-MCNC: 298 MG/DL (ref 200–475)
GLUCOSE SERPL-MCNC: 125 MG/DL (ref 65–100)
HCT VFR BLD AUTO: 35.8 % (ref 35–47)
HGB BLD-MCNC: 11.9 G/DL (ref 11.5–16)
IMM GRANULOCYTES # BLD AUTO: 0 K/UL (ref 0–0.04)
IMM GRANULOCYTES NFR BLD AUTO: 0 % (ref 0–0.5)
LDH SERPL L TO P-CCNC: 274 U/L (ref 81–246)
LYMPHOCYTES # BLD: 1.3 K/UL (ref 0.8–3.5)
LYMPHOCYTES NFR BLD: 26 % (ref 12–49)
MAGNESIUM SERPL-MCNC: 2.2 MG/DL (ref 1.6–2.4)
MCH RBC QN AUTO: 31.2 PG (ref 26–34)
MCHC RBC AUTO-ENTMCNC: 33.2 G/DL (ref 30–36.5)
MCV RBC AUTO: 94 FL (ref 80–99)
MONOCYTES # BLD: 0.5 K/UL (ref 0–1)
MONOCYTES NFR BLD: 10 % (ref 5–13)
NEUTS SEG # BLD: 3 K/UL (ref 1.8–8)
NEUTS SEG NFR BLD: 59 % (ref 32–75)
NRBC # BLD: 0 K/UL (ref 0–0.01)
NRBC BLD-RTO: 0 PER 100 WBC
P-R INTERVAL, ECG05: 174 MS
P-R INTERVAL, ECG05: 186 MS
PLATELET # BLD AUTO: 194 K/UL (ref 150–400)
PMV BLD AUTO: 10.3 FL (ref 8.9–12.9)
POTASSIUM SERPL-SCNC: 4.2 MMOL/L (ref 3.5–5.1)
PROCALCITONIN SERPL-MCNC: <0.05 NG/ML
Q-T INTERVAL, ECG07: 390 MS
Q-T INTERVAL, ECG07: 426 MS
QRS DURATION, ECG06: 72 MS
QRS DURATION, ECG06: 84 MS
QTC CALCULATION (BEZET), ECG08: 414 MS
QTC CALCULATION (BEZET), ECG08: 421 MS
RBC # BLD AUTO: 3.81 M/UL (ref 3.8–5.2)
SODIUM SERPL-SCNC: 140 MMOL/L (ref 136–145)
THERAPEUTIC RANGE,PTTT: ABNORMAL SECS (ref 58–77)
THERAPEUTIC RANGE,PTTT: ABNORMAL SECS (ref 58–77)
TROPONIN-HIGH SENSITIVITY: 677 NG/L (ref 0–51)
TROPONIN-HIGH SENSITIVITY: 884 NG/L (ref 0–51)
TSH SERPL DL<=0.05 MIU/L-ACNC: 7.61 UIU/ML (ref 0.36–3.74)
VENTRICULAR RATE, ECG03: 59 BPM
VENTRICULAR RATE, ECG03: 68 BPM
WBC # BLD AUTO: 5.1 K/UL (ref 3.6–11)

## 2021-12-07 PROCEDURE — 86140 C-REACTIVE PROTEIN: CPT

## 2021-12-07 PROCEDURE — 85025 COMPLETE CBC W/AUTO DIFF WBC: CPT

## 2021-12-07 PROCEDURE — 83735 ASSAY OF MAGNESIUM: CPT

## 2021-12-07 PROCEDURE — 93005 ELECTROCARDIOGRAM TRACING: CPT

## 2021-12-07 PROCEDURE — 99223 1ST HOSP IP/OBS HIGH 75: CPT | Performed by: STUDENT IN AN ORGANIZED HEALTH CARE EDUCATION/TRAINING PROGRAM

## 2021-12-07 PROCEDURE — 84484 ASSAY OF TROPONIN QUANT: CPT

## 2021-12-07 PROCEDURE — 80048 BASIC METABOLIC PNL TOTAL CA: CPT

## 2021-12-07 PROCEDURE — 74011250636 HC RX REV CODE- 250/636: Performed by: FAMILY MEDICINE

## 2021-12-07 PROCEDURE — 83615 LACTATE (LD) (LDH) ENZYME: CPT

## 2021-12-07 PROCEDURE — 85730 THROMBOPLASTIN TIME PARTIAL: CPT

## 2021-12-07 PROCEDURE — 65270000029 HC RM PRIVATE

## 2021-12-07 PROCEDURE — 36415 COLL VENOUS BLD VENIPUNCTURE: CPT

## 2021-12-07 PROCEDURE — 82728 ASSAY OF FERRITIN: CPT

## 2021-12-07 PROCEDURE — 74011250637 HC RX REV CODE- 250/637: Performed by: FAMILY MEDICINE

## 2021-12-07 PROCEDURE — 85384 FIBRINOGEN ACTIVITY: CPT

## 2021-12-07 PROCEDURE — 84443 ASSAY THYROID STIM HORMONE: CPT

## 2021-12-07 PROCEDURE — 84145 PROCALCITONIN (PCT): CPT

## 2021-12-07 RX ORDER — HEPARIN SODIUM 1000 [USP'U]/ML
4000 INJECTION, SOLUTION INTRAVENOUS; SUBCUTANEOUS ONCE
Status: COMPLETED | OUTPATIENT
Start: 2021-12-07 | End: 2021-12-07

## 2021-12-07 RX ORDER — CARVEDILOL 12.5 MG/1
25 TABLET ORAL 2 TIMES DAILY WITH MEALS
Status: DISCONTINUED | OUTPATIENT
Start: 2021-12-07 | End: 2021-12-08 | Stop reason: HOSPADM

## 2021-12-07 RX ORDER — PANTOPRAZOLE SODIUM 40 MG/1
40 TABLET, DELAYED RELEASE ORAL
Status: DISCONTINUED | OUTPATIENT
Start: 2021-12-07 | End: 2021-12-08 | Stop reason: HOSPADM

## 2021-12-07 RX ORDER — GUAIFENESIN 100 MG/5ML
81 LIQUID (ML) ORAL DAILY
Status: DISCONTINUED | OUTPATIENT
Start: 2021-12-07 | End: 2021-12-08 | Stop reason: HOSPADM

## 2021-12-07 RX ORDER — CETIRIZINE HCL 10 MG
10 TABLET ORAL DAILY
Status: DISCONTINUED | OUTPATIENT
Start: 2021-12-07 | End: 2021-12-08 | Stop reason: HOSPADM

## 2021-12-07 RX ORDER — METOPROLOL TARTRATE 25 MG/1
25 TABLET, FILM COATED ORAL 2 TIMES DAILY
Status: DISCONTINUED | OUTPATIENT
Start: 2021-12-07 | End: 2021-12-07

## 2021-12-07 RX ORDER — LEVOTHYROXINE SODIUM 88 UG/1
88 TABLET ORAL
Status: DISCONTINUED | OUTPATIENT
Start: 2021-12-07 | End: 2021-12-08 | Stop reason: HOSPADM

## 2021-12-07 RX ORDER — ATORVASTATIN CALCIUM 40 MG/1
40 TABLET, FILM COATED ORAL DAILY
Status: DISCONTINUED | OUTPATIENT
Start: 2021-12-07 | End: 2021-12-08 | Stop reason: HOSPADM

## 2021-12-07 RX ORDER — NITROGLYCERIN 0.4 MG/1
0.4 TABLET SUBLINGUAL AS NEEDED
Status: DISCONTINUED | OUTPATIENT
Start: 2021-12-07 | End: 2021-12-08 | Stop reason: HOSPADM

## 2021-12-07 RX ORDER — AMLODIPINE BESYLATE 5 MG/1
5 TABLET ORAL DAILY
Status: DISCONTINUED | OUTPATIENT
Start: 2021-12-07 | End: 2021-12-08 | Stop reason: HOSPADM

## 2021-12-07 RX ADMIN — CARVEDILOL 25 MG: 12.5 TABLET, FILM COATED ORAL at 07:25

## 2021-12-07 RX ADMIN — ACETAMINOPHEN 650 MG: 325 TABLET ORAL at 06:45

## 2021-12-07 RX ADMIN — HEPARIN SODIUM 4000 UNITS: 1000 INJECTION, SOLUTION INTRAVENOUS; SUBCUTANEOUS at 07:26

## 2021-12-07 RX ADMIN — ACETAMINOPHEN 650 MG: 325 TABLET ORAL at 00:16

## 2021-12-07 RX ADMIN — LEVOTHYROXINE SODIUM 88 MCG: 88 TABLET ORAL at 06:45

## 2021-12-07 RX ADMIN — Medication 10 ML: at 06:46

## 2021-12-07 RX ADMIN — ATORVASTATIN CALCIUM 40 MG: 40 TABLET, FILM COATED ORAL at 06:45

## 2021-12-07 RX ADMIN — CARVEDILOL 25 MG: 12.5 TABLET, FILM COATED ORAL at 16:53

## 2021-12-07 RX ADMIN — AMLODIPINE BESYLATE 5 MG: 5 TABLET ORAL at 08:58

## 2021-12-07 RX ADMIN — PANTOPRAZOLE SODIUM 40 MG: 40 TABLET, DELAYED RELEASE ORAL at 06:45

## 2021-12-07 RX ADMIN — CETIRIZINE HYDROCHLORIDE 10 MG: 10 TABLET, FILM COATED ORAL at 08:58

## 2021-12-07 RX ADMIN — Medication 10 ML: at 00:25

## 2021-12-07 RX ADMIN — Medication 10 ML: at 22:00

## 2021-12-07 RX ADMIN — Medication 10 ML: at 13:50

## 2021-12-07 RX ADMIN — HEPARIN SODIUM 4000 UNITS: 1000 INJECTION, SOLUTION INTRAVENOUS; SUBCUTANEOUS at 00:09

## 2021-12-07 RX ADMIN — HEPARIN SODIUM AND DEXTROSE 11 UNITS/KG/HR: 10000; 5 INJECTION INTRAVENOUS at 00:09

## 2021-12-07 RX ADMIN — ASPIRIN 81 MG CHEWABLE TABLET 81 MG: 81 TABLET CHEWABLE at 08:58

## 2021-12-07 NOTE — ED NOTES
Pt reports taking one 81 mg aspirin at home prior to going to [facility], and then received 162mg from [facility]. Verbal order received from ED MD to give one 81 mg aspirin.

## 2021-12-07 NOTE — PROGRESS NOTES
[facility] Adult  Hospitalist Group                                                                                          Hospitalist Progress Note  Henny Dietz MD  Answering service: [phone] OR [phone] from in house phone        Date of Service:  2021  NAME:  Finn Gonzalez  :  1945  MRN:  [id number]      Admission Summary:   Finn Gonzalez is a 78 y.o. female with a pmhx CAD,  chronic pancreatitis, GERD, and  HTN who presents as a transfer from [facility] for COVID, and  NSTEMI. Patient stated that she woke up this morning with midsternal chest tightness that was continuous until about 3:30 PM.  She denies fever, chills, nausea, vomiting, diaphoresis, cough, palpitations, or lower extremity edema. She has a past medical history of CAD status post cath in  with reported lesions in the first diagonal, and RCA. She reports that she did receive any intervention at that time. She does not remember the name of her physician. Interval history / Subjective:      Denies any chest pain. No pressure. No LE edema, no PND, no orthopnea. Feels back to her baseline.    Assessment & Plan:     Chest pain  Elevated troponin - Trop relatively flat   - DC heparin   - Cardiology consulted  - Echo   - Continue home coreg  - Add ACE if EF depressed    COVID+ - incidental finding  - Pt had exposure over 4 weeks ago, asymtpomatic.   - Will need to quarantine 10 days until     HTN - continue home coreg, norvasc   Hypothyroid - home synthroid   GERD - PPI   Allergies - zyrtec    Code status: FULL  DVT prophylaxis: heparin gtt ---? prophylaxis tomorrow     Care Plan discussed with: Patient/Family  Anticipated Disposition: Home w/Family  Anticipated Discharge: Less than 24 hours     Hospital Problems  Date Reviewed: 2021          Codes Class Noted POA    NSTEMI (non-ST elevated myocardial infarction) [facility]) ICD-10-CM: I21.4  ICD-9-CM: 410.70  2021 Unknown        Chest pain ICD-10-CM: R07.9  ICD-9-CM: 786.50  12/6/2021 Unknown        Hypertensive urgency ICD-10-CM: I16.0  ICD-9-CM: 401.9  12/6/2021 Unknown                Review of Systems:   A comprehensive review of systems was negative except for that written in the HPI. Vital Signs:    Last 24hrs VS reviewed since prior progress note. Most recent are:  Visit Vitals  BP (!) 167/80 (BP 1 Location: Left upper arm, BP Patient Position: At rest)   Pulse 73   Temp 98.3 °F (36.8 °C)   Resp 18   Ht 5' 7\" (1.702 m)   Wt 86.3 kg (190 lb 4.1 oz)   SpO2 96%   BMI 29.80 kg/m²         Intake/Output Summary (Last 24 hours) at 12/7/2021 1537  Last data filed at 12/7/2021 8973  Gross per 24 hour   Intake 29.13 ml   Output    Net 29.13 ml        Physical Examination:     I had a face to face encounter with this patient and independently examined them on 12/7/2021 as outlined below:          Constitutional:  No acute distress, cooperative, pleasant    ENT:  Oral mucosa moist, oropharynx benign. Resp:  CTA bilaterally. No wheezing/rhonchi/rales. No accessory muscle use   CV:  Regular rhythm, normal rate, no murmurs, gallops, rubs    GI:  Soft, non distended, non tender. normoactive bowel sounds, no hepatosplenomegaly     Musculoskeletal:  No edema, warm, 2+ pulses throughout    Neurologic:  Moves all extremities.   AAOx3, CN II-XII reviewed            Data Review:    Review and/or order of clinical lab test  Review and/or order of tests in the radiology section of CPT  Review and/or order of tests in the medicine section of CPT      Labs:     Recent Labs     12/07/21  0021 12/06/21 2008   WBC 5.1 6.9   HGB 11.9 12.6   HCT 35.8 36.9    207     Recent Labs     12/07/21  0021 12/06/21 2008 12/06/21  0851    141 138   K 4.2 3.6 4.3    104 101   CO2 26 30 28   BUN 16 16 18   CREA 1.19* 1.24* 1.40*   * 127* 128*   CA 9.3 8.9 9.5   MG 2.2  --   --      Recent Labs     12/06/21 2008 12/06/21  0851   ALT 72 30   AP 77 60   TBILI 0.3 0.4   TP 6.6 6.9   ALB 3.6 3.6   GLOB 3.0 3.3     Recent Labs     12/07/21  0604 12/07/21  0021   APTT 34.4* 21.9*      Recent Labs     12/07/21  0604   FERR 144      No results found for: FOL, RBCF   No results for input(s): PH, PCO2, PO2 in the last 72 hours. No results for input(s): CPK, CKNDX, TROIQ in the last 72 hours.     No lab exists for component: CPKMB  Lab Results   Component Value Date/Time    Cholesterol, total 184 06/14/2019 11:05 AM    HDL Cholesterol 50 06/14/2019 11:05 AM    LDL, calculated 112 (H) 06/14/2019 11:05 AM    Triglyceride 110 06/14/2019 11:05 AM    CHOL/HDL Ratio 5.0 03/03/2014 04:54 AM     No results found for: GLUCPOC  Lab Results   Component Value Date/Time    Color YELLOW/STRAW 12/06/2021 02:10 PM    Appearance CLEAR 12/06/2021 02:10 PM    Specific gravity 1.010 12/06/2021 02:10 PM    Specific gravity 1.011 05/21/2019 09:32 PM    pH (UA) 6.0 12/06/2021 02:10 PM    Protein Negative 12/06/2021 02:10 PM    Glucose Negative 12/06/2021 02:10 PM    Ketone Negative 12/06/2021 02:10 PM    Bilirubin Negative 12/06/2021 02:10 PM    Urobilinogen 0.2 12/06/2021 02:10 PM    Nitrites Negative 12/06/2021 02:10 PM    Leukocyte Esterase Negative 12/06/2021 02:10 PM    Epithelial cells FEW 12/06/2021 02:10 PM    Bacteria Negative 12/06/2021 02:10 PM    WBC 0-4 12/06/2021 02:10 PM    RBC 0-5 12/06/2021 02:10 PM         Medications Reviewed:     Current Facility-Administered Medications   Medication Dose Route Frequency    atorvastatin (LIPITOR) tablet 40 mg  40 mg Oral DAILY    nitroglycerin (NITROSTAT) tablet 0.4 mg  0.4 mg SubLINGual PRN    pantoprazole (PROTONIX) tablet 40 mg  40 mg Oral ACB    levothyroxine (SYNTHROID) tablet 88 mcg  88 mcg Oral ACB    cetirizine (ZYRTEC) tablet 10 mg  10 mg Oral DAILY    carvediloL (COREG) tablet 25 mg  25 mg Oral BID WITH MEALS    aspirin chewable tablet 81 mg  81 mg Oral DAILY    amLODIPine (NORVASC) tablet 5 mg  5 mg Oral DAILY    sodium chloride (NS) flush 5-40 mL  5-40 mL IntraVENous Q8H    sodium chloride (NS) flush 5-40 mL  5-40 mL IntraVENous PRN    acetaminophen (TYLENOL) tablet 650 mg  650 mg Oral Q6H PRN    Or    acetaminophen (TYLENOL) suppository 650 mg  650 mg Rectal Q6H PRN    polyethylene glycol (MIRALAX) packet 17 g  17 g Oral DAILY PRN    ondansetron (ZOFRAN ODT) tablet 4 mg  4 mg Oral Q8H PRN    Or    ondansetron (ZOFRAN) injection 4 mg  4 mg IntraVENous Q6H PRN     ______________________________________________________________________  EXPECTED LENGTH OF STAY: - - -  ACTUAL LENGTH OF STAY:          1                 Bladimir Lira MD

## 2021-12-07 NOTE — ROUTINE PROCESS
TRANSFER - OUT REPORT:    Verbal report given to HALEY Fonseca(name) on Yuliya Nielsen  being transferred to (unit) for routine progression of care       Report consisted of patients Situation, Background, Assessment and   Recommendations(SBAR). Information from the following report(s) SBAR, Kardex, ED Summary, MAR, Recent Results and Cardiac Rhythm NSR was reviewed with the receiving nurse. Lines:   Peripheral IV 12/06/21 Right Antecubital (Active)   Site Assessment Clean, dry, & intact 12/06/21 2211   Phlebitis Assessment 0 12/06/21 2211   Infiltration Assessment 0 12/06/21 2211   Dressing Status Clean, dry, & intact 12/06/21 2211   Dressing Type Transparent 12/06/21 2211   Hub Color/Line Status Pink 12/06/21 2211        Opportunity for questions and clarification was provided.       Patient transported with:   Monitor  Registered Nurse

## 2021-12-07 NOTE — PROGRESS NOTES
2300 TRANSFER - IN REPORT:    Verbal report received from Clarke(name) on Corina Neil  being received from ED(unit) for routine progression of care      Report consisted of patients Situation, Background, Assessment and   Recommendations(SBAR). Information from the following report(s) SBAR, Kardex, MAR, Accordion, and Cardiac Rhythm NSR  was reviewed with the receiving nurse. Opportunity for questions and clarification was provided. Assessment completed upon patients arrival to unit and care assumed. 2340 Patient arrived from the ED, on monitor. Patient alert and oriented x4, able to ambulate from stretcher to bed. Skin assessment done, no breakdown noted. No complaints of chest pain and call bell left within reach. 0000 Heparin drip started. Problem: Risk for Spread of Infection  Goal: Prevent transmission of infectious organism to others  Description: Prevent the transmission of infectious organisms to other patients, staff members, and visitors. Outcome: Progressing Towards Goal     Problem: Unstable angina/NSTEMI: Day of Admission/Day 1  Goal: Activity/Safety  Outcome: Progressing Towards Goal       0735 Bedside shift change report given to Olesya Posada RN by Jeanette Abraham RN. Report included the following information SBAR, Kardex, MAR, Accordion, Recent Results and Cardiac Rhythm NSR.

## 2021-12-07 NOTE — H&P
[facility] [facility] Adult  Hospitalist Group  History and Physical    Primary Care Provider: Albino Francis NP  Date of Service:  12/6/2021    Subjective:     Cha Sylvester is a 78 y.o. female with a pmhx CAD,  chronic pancreatitis, GERD, and  HTN who presents as a transfer from [facility] for COVID, and  NSTEMI. Patient stated that she woke up this morning with midsternal chest tightness that was continuous until about 3:30 PM.  She denies fever, chills, nausea, vomiting, diaphoresis, cough, palpitations, or lower extremity edema. She has a past medical history of CAD status post cath in 2014 with reported lesions in the first diagonal, and RCA. She reports that she did receive any intervention at that time. She does not remember the name of her physician. In the ED, VSS. Labs were significant for creatinine 1.4 (b/l 1.3), and troponin of 8, and 32 that were done at 8 AM, and 10 AM respectively. EKG shows NSR. She did have a stress test in 1/2020 that showed no ischemia with EF 67%. Review of Systems:    All other review of systems were negative except for that written in the History of Present Illness. Past Medical History:   Diagnosis Date    Anxiety and depression     Chronic headaches . vascular    Chronic pancreatitis ([facility] [address] 12/24/2013    Depression     Duodenal ulcer     GERD (gastroesophageal reflux disease)     Hypertension     IBS (irritable bowel syndrome)     Menopause     Onychomycosis     Pancreatitis     Recurrent sinusitis     S/P cardiac cath 3/4/2014    3/3/14 1st diagonal: 60 %  proximal -- Mid RCA: There was a 55 % stenosis.  Sciatica     reccurrent      Past Surgical History:   Procedure Laterality Date    HX APPENDECTOMY      HX CHOLECYSTECTOMY      ND ABDOMEN SURGERY PROC UNLISTED       Prior to Admission medications    Medication Sig Start Date End Date Taking?  Authorizing Provider   amoxicillin-clavulanate (Augmentin) 875-125 mg per tablet Take 1 Tablet by mouth two (2) times a day. 11/28/21   Adis Carreon MD   lidocaine (Lidoderm) 5 % Apply patch to the affected area for 12 hours a day and remove for 12 hours a day. 7/10/21   Ang Aguero MD   levocetirizine (XYZAL) 5 mg tablet Take 1 Tablet by mouth daily. 7/8/21   Ankur MarchBUCKY   levothyroxine (SYNTHROID) 88 mcg tablet Take 1 Tablet by mouth Daily (before breakfast). 7/8/21   Farmagalys MarchBUCKY   irbesartan-hydroCHLOROthiazide (AVALIDE) 300-12.5 mg per tablet TAKE 1 TABLET EVERY DAY FOR PRESSURE and heart 2/8/21   Good, Luz Marina R, DO   pravastatin (PRAVACHOL) 40 mg tablet Take 1 Tab by mouth nightly. Indications: heart and cholesterol 2/8/21   Good, Luz Marina R, DO   amLODIPine (NORVASC) 5 mg tablet TAKE 1 TABLET EVERY DAY for pressure and heart 2/8/21   Good, Luz Marina R, DO   omeprazole (PRILOSEC) 40 mg capsule TAKE 1 CAPSULE EVERY DAY for stomach 2/8/21   Good, Luz Marina R, DO   carvediloL (COREG) 25 mg tablet TAKE 1 TABLET TWICE DAILY WITH MEALS FOR PRESSURE AND HEART 2/8/21   Tyler, Arnett Schlatter R, DO   betamethasone dipropionate (DIPROLENE) 0.05 % ointment Apply  to affected area two (2) times a day. 11/10/20   Soledad Carrasco, DO   cyanocobalamin, vitamin B-12, 5,000 mcg TbDi Take 1 Tab by mouth daily. Provider, Historical   aspirin 81 mg chewable tablet Take 1 Tab by mouth daily. 12/13/18   Landen Byrd MD   nitroglycerin (NITROSTAT) 0.4 mg SL tablet 1 Tab by SubLINGual route every five (5) minutes as needed for Chest Pain (call 911 if not relieved by 3). 6/15/18   Meg Irizarry NP   cholecalciferol, VITAMIN D3, (VITAMIN D3) 5,000 unit tab tablet Take 1 Tab by mouth daily.  12/18/13   Sophia Hansen MD     Allergies   Allergen Reactions    Nitrate Analogues Other (comments)     Severe headache    Bactrim [Sulfamethoprim] Rash      Family History   Problem Relation Age of Onset    Heart Disease Mother     Heart Disease Father SOCIAL HISTORY:  Patient resides at Home. Patient ambulates independently  Alcohol history: none    Objective:       Physical Exam:   Visit Vitals  BP (!) 112/52   Pulse 67   Temp 98.5 °F (36.9 °C)   Resp 12   Ht 5' 7\" (1.702 m)   Wt 86.2 kg (190 lb 0.6 oz)   SpO2 94%   BMI 29.76 kg/m²     General:  Alert, cooperative, no distress, appears stated age. Head:  Normocephalic, without obvious abnormality, atraumatic. Eyes:  Conjunctivae/corneas clear. EOMs intact. Nose: Nares normal. Septum midline   Throat: Lips, mucosa, and tongue normal.   Neck: Supple, symmetrical, trachea midline, no adenopathy, thyroid: no enlargement/tenderness/nodules, no carotid bruit and no JVD. Back:   Symmetric, no curvature. ROM normal. No CVA tenderness. Lungs:   Clear to auscultation bilaterally. Chest wall:  No tenderness or deformity. Heart:  Regular rate and rhythm, S1, S2 normal, no murmur, click, rub or gallop. Abdomen:   Soft, non-tender. Bowel sounds normal.            Extremities: Extremities normal, atraumatic, no cyanosis or edema. Pulses: 2+ radial pulses   Skin: Skin color, texture, turgor normal. No rashes or lesions. Lymph nodes: Cervical, supraclavicular, and axillary nodes normal.   Neurologic: CNII-XII intact. Normal strength, sensation and reflexes throughout. Cap refill: Brisk, less than 3 seconds  Pulses: 2+, symmetric in all extremities    ECG: NSR, repeat pending    Data Review: All diagnostic labs and studies have been reviewed.     Chest x-ray: No acute cardiopulmonary process    Assessment:     Active Problems:    NSTEMI (non-ST elevated myocardial infarction) ([facility] [address] (12/6/2021)        Plan:     #type I NSTEMI  #CAD  trop 8>32>708, EKG NSR  Describes midsternal chest pressure at rest, that had resolved by the time of my evaluation  Start heparin drip  ASA, Lipitor, carvedilol, as needed NTG  Consult cardiology    #History COVID-19  Chastity Cummings was diagnosed with Covid via home test that was +2 weeks ago, she had a subsequent test that was negative, but rapid today is positive again  [facility] currently has no oxygen requirement  We will check laboratory, and coagulation markers  She is already on an drip for NSTEMI    #Hypertension  Patient with SBP to the 200s while at outside hospital, now improved  Continue home Norvasc, and carvedilol    #Hypothyroidism  Continue home levothyroxine  Check TSH    #GERD  Continue PPI    #Environmental allergies  Continue Zyrtec    FEN: NPO  DVT PPx: heparin gtt  MPOA: Charlie Jasso (daughter)  Code: Full    FUNCTIONAL STATUS PRIOR TO HOSPITALIZATION Ambulates Independently     Signed By: Lynette Willingham MD     December 6, 2021

## 2021-12-07 NOTE — ED PROVIDER NOTES
HPI   82-year-old woman with a past medical history of chronic pancreatitis and hypertension who presents to the emergency department as a transfer from outside hospital due to an NSTEMI. Earlier today the patient got some central chest pressure that she describes as heaviness. Ultimately she went to an outside hospital where she had a high-sensitivity troponin of 8 that up trended to 32. She was given a half dose aspirin and then transferred here for cardiology. She is currently asymptomatic. She denies any history of coronary artery disease. She does not smoke. She was also found to be COVID-19 positive. Past Medical History:   Diagnosis Date    Anxiety and depression     Chronic headaches . vascular    Chronic pancreatitis ([address] 12/24/2013    Depression     Duodenal ulcer     GERD (gastroesophageal reflux disease)     Hypertension     IBS (irritable bowel syndrome)     Menopause     Onychomycosis     Pancreatitis     Recurrent sinusitis     S/P cardiac cath 3/4/2014    3/3/14 1st diagonal: 60 %  proximal -- Mid RCA: There was a 55 % stenosis.        Sciatica     reccurrent       Past Surgical History:   Procedure Laterality Date    HX APPENDECTOMY      HX CHOLECYSTECTOMY      LA ABDOMEN SURGERY PROC UNLISTED           Family History:   Problem Relation Age of Onset    Heart Disease Mother     Heart Disease Father        Social History     Socioeconomic History    Marital status:      Spouse name: Not on file    Number of children: 2    Years of education: Not on file    Highest education level: GED or equivalent   Occupational History    Not on file   Tobacco Use    Smoking status: Never Smoker    Smokeless tobacco: Never Used   Substance and Sexual Activity    Alcohol use: No     Alcohol/week: 0.0 standard drinks    Drug use: No    Sexual activity: Never   Other Topics Concern     Service No    Blood Transfusions No    Caffeine Concern No    Occupational Exposure No    Hobby Hazards No    Sleep Concern Yes     Comment: not sleeping good    Stress Concern No    Weight Concern No    Special Diet No    Back Care No    Exercise No    Bike Helmet No    Seat Belt Yes    Self-Exams Yes     Comment: checks   Social History Narrative    Not on file     Social Determinants of Health     Financial Resource Strain:     Difficulty of Paying Living Expenses: Not on file   Food Insecurity:     Worried About Running Out of Food in the Last Year: Not on file    Adrienne of Food in the Last Year: Not on file   Transportation Needs:     Lack of Transportation (Medical): Not on file    Lack of Transportation (Non-Medical): Not on file   Physical Activity:     Days of Exercise per Week: Not on file    Minutes of Exercise per Session: Not on file   Stress:     Feeling of Stress : Not on file   Social Connections:     Frequency of Communication with Friends and Family: Not on file    Frequency of Social Gatherings with Friends and Family: Not on file    Attends Worship Services: Not on file    Active Member of [address] [place] [address] or Organizations: Not on file    Attends Club or Organization Meetings: Not on file    Marital Status: Not on file   Intimate Partner Violence:     Fear of Current or Ex-Partner: Not on file    Emotionally Abused: Not on file    Physically Abused: Not on file    Sexually Abused: Not on file   Housing Stability:     Unable to Pay for Housing in the Last Year: Not on file    Number of [place] in the Last Year: Not on file    Unstable Housing in the Last Year: Not on file         ALLERGIES: Nitrate analogues and Bactrim [sulfamethoprim]    Review of Systems   A complete review of systems was performed and all systems reviewed were negative unless documented in the HPI.     Vitals:    12/06/21 1840   BP: 137/70   Pulse: 64   Resp: 22   Temp: 98.5 °F (36.9 °C)   SpO2: 95%   Weight: 86.2 kg (190 lb 0.6 oz)   Height: 5' 7\" (1.702 m) Physical Exam  Constitutional:       Comments: No acute distress noted. Not diaphoretic   HENT:      Head: Normocephalic and atraumatic. Eyes:      General: No scleral icterus. Extraocular Movements: Extraocular movements intact. Neck:      Comments: Trachea midline. No JVD  Cardiovascular:      Comments: Regular rate and rhythm without murmurs  Pulmonary:      Comments: Clear breath sounds bilaterally without wheezes or rales. No respiratory distress noted  Abdominal:      General: There is no distension. Palpations: Abdomen is soft. Tenderness: There is no abdominal tenderness. Musculoskeletal:         General: Normal range of motion. Right lower leg: No edema. Left lower leg: No edema. Skin:     General: Skin is warm and dry. Neurological:      Comments: Awake and alert. Speech is normal.  GCS 15          MDM   63-year-old woman who presents with the above chief complaint. Vital signs stable. She appears well. EKG shows no evidence of STEMI or concerning ST depressions or elevations. We will continue to trend troponins and give patient her full dose aspirin. Will place on isolation precautions and admit to the hospitalist.    [address] [place] [address] for Admission  8:02 PM    ED Room Number: ER06/06  Patient Name and age:  Yuliya Nielsen 78 y.o.  female  Working Diagnosis: NSTEMI/Chest pain    COVID-19 Suspicion:  yes  Sepsis present:  no  Reassessment needed: yes  Code Status:  Full Code  Readmission: no  Isolation Requirements:  yes  Recommended Level of Care:  telemetry  Department:  [address] [place] [address] ED - [phone]    Other: Transfer from [address] [place] [address]. Chest pain-free. Will trend troponins. EKG reassuring.     Procedures

## 2021-12-07 NOTE — CONSULTS
Sloan Monroy DO  [facility] [address], [address] [facility], [address] [place] [address]                                       Office [phone],DAMIAN [phone]  Office [phone],PRQ [phone]      Date of  Admission: 12/6/2021  6:22 PM  PCP- Mikaela Griffiths NP    Finn Gonzalez is a 78 y.o. female, cardiology asked to manage elevated troponin    Requested by Charissa Polo I*    Assessment/Plan      COVID-19 positive-patient likely noninfectious. Subacute period. Exposure approximately 4 weeks ago    Chest pain, elevated troponinpresentation not consistent with acute coronary syndrome. She does report significant stress related to individual who is critically ill with covid-19. Possible stress cardiomyopathy. Recommend to discontinue heparin. Continue home carvedilol. Echocardiogram pending to assess LV function. If she does have depressed LV function related to stress cardiomyopathy, will benefit from ACE inhibitor and continuation of her beta-blocker therapy. []    High complexity decision making was performed  []    Patient is at high-risk of decompensation with multiple organ involvement      I appreciate the opportunity to be involved in [facility]. See below note for details. Please do not hesitate to contact us with questions or concerns. Bryanna Richardson DO      Subjective:  Finn Gonzalez is a 78 y.o. female transferred from [facility] due to elevation in troponin. Patient was exposed to COVID-19 approximately 4 weeks ago. She reports she tested negative at that time. Presented to the ED with substernal chest discomfort that lasted several hours. High sensitive troponin peaked at around 800. She has no ongoing chest pain or chest pressure symptoms. No exertional chest pain or chest pressure symptoms prior to her presentation. No orthopnea or PND. No ongoing exertional dyspnea. EKG without evidence of ischemia. Past Medical History:   Diagnosis Date    Anxiety and depression     Chronic headaches . vascular    Chronic pancreatitis ([address] 12/24/2013    Depression     Duodenal ulcer     GERD (gastroesophageal reflux disease)     Hypertension     IBS (irritable bowel syndrome)     Menopause     Onychomycosis     Pancreatitis     Recurrent sinusitis     S/P cardiac cath 3/4/2014    3/3/14 1st diagonal: 60 %  proximal -- Mid RCA: There was a 55 % stenosis.  Sciatica     reccurrent      Past Surgical History:   Procedure Laterality Date    HX APPENDECTOMY      HX CHOLECYSTECTOMY      MO ABDOMEN SURGERY PROC UNLISTED       Allergies   Allergen Reactions    Nitrate Analogues Other (comments)     Severe headache    Bactrim [Sulfamethoprim] Rash     Family History   Problem Relation Age of Onset    Heart Disease Mother     Heart Disease Father       Social History     Tobacco Use    Smoking status: Never Smoker    Smokeless tobacco: Never Used   Substance Use Topics    Alcohol use: No     Alcohol/week: 0.0 standard drinks    Drug use: No          Medications:  Medications Prior to Admission   Medication Sig    amoxicillin-clavulanate (Augmentin) 875-125 mg per tablet Take 1 Tablet by mouth two (2) times a day.  lidocaine (Lidoderm) 5 % Apply patch to the affected area for 12 hours a day and remove for 12 hours a day.  levocetirizine (XYZAL) 5 mg tablet Take 1 Tablet by mouth daily.  levothyroxine (SYNTHROID) 88 mcg tablet Take 1 Tablet by mouth Daily (before breakfast).  irbesartan-hydroCHLOROthiazide (AVALIDE) 300-12.5 mg per tablet TAKE 1 TABLET EVERY DAY FOR PRESSURE and heart    pravastatin (PRAVACHOL) 40 mg tablet Take 1 Tab by mouth nightly.  Indications: heart and cholesterol    amLODIPine (NORVASC) 5 mg tablet TAKE 1 TABLET EVERY DAY for pressure and heart    omeprazole (PRILOSEC) 40 mg capsule TAKE 1 CAPSULE EVERY DAY for stomach    carvediloL (COREG) 25 mg tablet TAKE 1 TABLET TWICE DAILY WITH MEALS FOR PRESSURE AND HEART    betamethasone dipropionate (DIPROLENE) 0.05 % ointment Apply  to affected area two (2) times a day.  cyanocobalamin, vitamin B-12, 5,000 mcg TbDi Take 1 Tab by mouth daily.  aspirin 81 mg chewable tablet Take 1 Tab by mouth daily.  nitroglycerin (NITROSTAT) 0.4 mg SL tablet 1 Tab by SubLINGual route every five (5) minutes as needed for Chest Pain (call 911 if not relieved by 3).  cholecalciferol, VITAMIN D3, (VITAMIN D3) 5,000 unit tab tablet Take 1 Tab by mouth daily.      Current Facility-Administered Medications   Medication Dose Route Frequency    atorvastatin (LIPITOR) tablet 40 mg  40 mg Oral DAILY    nitroglycerin (NITROSTAT) tablet 0.4 mg  0.4 mg SubLINGual PRN    pantoprazole (PROTONIX) tablet 40 mg  40 mg Oral ACB    levothyroxine (SYNTHROID) tablet 88 mcg  88 mcg Oral ACB    cetirizine (ZYRTEC) tablet 10 mg  10 mg Oral DAILY    carvediloL (COREG) tablet 25 mg  25 mg Oral BID WITH MEALS    aspirin chewable tablet 81 mg  81 mg Oral DAILY    amLODIPine (NORVASC) tablet 5 mg  5 mg Oral DAILY    sodium chloride (NS) flush 5-40 mL  5-40 mL IntraVENous Q8H    sodium chloride (NS) flush 5-40 mL  5-40 mL IntraVENous PRN    acetaminophen (TYLENOL) tablet 650 mg  650 mg Oral Q6H PRN    Or    acetaminophen (TYLENOL) suppository 650 mg  650 mg Rectal Q6H PRN    polyethylene glycol (MIRALAX) packet 17 g  17 g Oral DAILY PRN    ondansetron (ZOFRAN ODT) tablet 4 mg  4 mg Oral Q8H PRN    Or    ondansetron (ZOFRAN) injection 4 mg  4 mg IntraVENous Q6H PRN         Review of Systems:  Pertinent Positive: Resolved chest pain  Pertinent Negative:No chest pain, dyspnea on exertion, shortness of breath, orthopnea, PND    All Other systems reviewed and are negative for a Comprehensive ROS (10+)        Physical Exam:  Visit Vitals  BP (!) 167/80 (BP 1 Location: Left upper arm, BP Patient Position: At rest) Pulse 71   Temp 98.3 °F (36.8 °C)   Resp 18   Ht 5' 7\" (1.702 m)   Wt 190 lb 4.1 oz (86.3 kg)   SpO2 96%   BMI 29.80 kg/m²           Gen: Well-developed, well-nourished, in no acute distress  HEENT:  Pink conjunctivae, hearing intact to voice, moist mucous membranes  Neck: Supple,No JVD, No Carotid Bruit  Resp: No accessory muscle use, Clear breath sounds, No rales or rhonchi  Card: Normal Rate,Regular Rythm,Normal S1, S2, No murmurs, rubs or gallop. No thrills.    Abd:  Soft, non-tender, non-distended, normoactive bowel sounds are present   MSK: No cyanosis or clubbing  Skin: No rashes or ulcers  Neuro:  Cranial nerves are grossly intact, moving all four extremities, no focal deficit, follows commands appropriately  Psych:  Good insight, oriented to person, place and time, alert, Nml Affect  LE: No edema  Vascular:Distal Pulses 2+ and symmetric      LABS:    Lab Results   Component Value Date/Time    WBC 5.1 12/07/2021 12:21 AM    HGB 11.9 12/07/2021 12:21 AM    HCT 35.8 12/07/2021 12:21 AM    PLATELET 997 60/10/4679 12:21 AM    MCV 94.0 12/07/2021 12:21 AM     Lab Results   Component Value Date/Time    Sodium 140 12/07/2021 12:21 AM    Potassium 4.2 12/07/2021 12:21 AM    Chloride 104 12/07/2021 12:21 AM    CO2 26 12/07/2021 12:21 AM    Anion gap 10 12/07/2021 12:21 AM    Glucose 125 (H) 12/07/2021 12:21 AM    BUN 16 12/07/2021 12:21 AM    Creatinine 1.19 (H) 12/07/2021 12:21 AM    BUN/Creatinine ratio 13 12/07/2021 12:21 AM    GFR est AA 54 (L) 12/07/2021 12:21 AM    GFR est non-AA 44 (L) 12/07/2021 12:21 AM    Calcium 9.3 12/07/2021 12:21 AM     Lab Results   Component Value Date/Time    Troponin-I, Qt. <0.05 12/13/2019 11:25 AM     Lab Results   Component Value Date/Time    aPTT 34.4 (H) 12/07/2021 06:04 AM     Lab Results   Component Value Date/Time    INR 1.1 03/02/2014 06:31 PM    Prothrombin time 10.9 03/02/2014 06:31 PM           Bandar Pereira How, DO    ATTENTION:   This medical record was transcribed using an electronic medical records/speech recognition system. Although proofread, it may and can contain electronic, spelling and other errors. Corrections may be executed at a later time. Please feel free to contact us for any clarifications as needed.

## 2021-12-07 NOTE — PROGRESS NOTES
Transition of Care Plan   RUR- 9% Moderate Risk   DISPOSITION: The disposition plan is pending medical progression and recommendation.  F/U with PCP/Specialist     Transport: [facility]family     Reason for Admission: \"chest pain\"                  RUR Score:  9% Moderate Risk                   Plan for utilizing home health: N/A         PCP: First and Last name:  Nadeen Robles NP     Name of Practice: [facility]    Are you a current patient: Yes/No: Yes   Approximate date of last visit: Unknown at this time   Can you participate in a virtual visit with your PCP: N/A                    Current Advanced Directive/Advance Care Plan: Full Code  Has ACP documentation on file at this time. Healthcare Decision Maker:   Click here to complete [place] including selection of the Healthcare Decision Maker Relationship (ie \"Primary\")  Daughter                         Transition of Care Plan: Pending recommendation. Reviewed chart for transitions of care and discussed in rounds. CM met with patient at bedside to explain role and offer support. Patient is alert and oriented x4 and confirmed demographics. TW spoke with patients daughter to complete this assessment. TW attempted to contact patient via patient room, unable to get in touch with patient. Baseline: DME - none  ADLs/IDALS: Independent   Previous Home Health: N/A  Previous SNF/IPR: N/A  ER Contact: Daughter - [place] - [phone]    Patient lives in a 1 level house with 2 steps to enter. Patient uses the backdoor to enter her home. Patient does live alone. Patient is independent with ADLs/IDALs. Patient does not utilize DMEs to ambulate. Patient's preferred pharmacy is [address] in [place], [place]. Patient's daughter is expected to transport at discharge. Care Management Interventions  PCP Verified by CM:  Yes  Palliative Care Criteria Met (RRAT>21 & CHF Dx)?: No  Mode of Transport at Discharge: Other (see comment)  Transition of Care Consult (CM Consult): Discharge Planning  MyChart Signup: Yes  Discharge Durable Medical Equipment: No  Physical Therapy Consult: No  Occupational Therapy Consult: No  Speech Therapy Consult: No  Support Systems: Child(ruben)  Confirm Follow Up Transport: Family  The Patient and/or Patient Representative was Provided with a Choice of Provider and Agrees with the Discharge Plan?: Yes  Freedom of Choice List was Provided with Basic Dialogue that Supports the Patient's Individualized Plan of Care/Goals, Treatment Preferences and Shares the Quality Data Associated with the Providers?: Yes  [place] Resource Information Provided?: No    CM will continue to provide updates as they become available. CM will continue to follow, provide support and assist with LOIDA needs as they arise. Medicare pt has received, reviewed, and signed 1st IM letter informing them of their right to appeal the discharge. Signed copy has been placed on pt bedside chart.     VINICIUS Clark, CRM, LMHP-e

## 2021-12-07 NOTE — PROGRESS NOTES
Bedside shift change report given to [facility] (oncoming nurse) by Deya Unger (offgoing nurse). Report included the following information SBAR, Kardex, MAR and Recent Results.

## 2021-12-08 ENCOUNTER — APPOINTMENT (OUTPATIENT)
Dept: NON INVASIVE DIAGNOSTICS | Age: 76
DRG: 313 | End: 2021-12-08
Attending: STUDENT IN AN ORGANIZED HEALTH CARE EDUCATION/TRAINING PROGRAM
Payer: MEDICARE

## 2021-12-08 VITALS
DIASTOLIC BLOOD PRESSURE: 85 MMHG | OXYGEN SATURATION: 95 % | TEMPERATURE: 98.7 F | SYSTOLIC BLOOD PRESSURE: 153 MMHG | HEIGHT: 67 IN | HEART RATE: 66 BPM | RESPIRATION RATE: 16 BRPM | BODY MASS INDEX: 31.23 KG/M2 | WEIGHT: 199 LBS

## 2021-12-08 LAB
ANION GAP SERPL CALC-SCNC: 6 MMOL/L (ref 5–15)
APTT PPP: 22.7 SEC (ref 22.1–31)
BASOPHILS # BLD: 0 K/UL (ref 0–0.1)
BASOPHILS NFR BLD: 1 % (ref 0–1)
BUN SERPL-MCNC: 14 MG/DL (ref 6–20)
BUN/CREAT SERPL: 12 (ref 12–20)
CALCIUM SERPL-MCNC: 9.3 MG/DL (ref 8.5–10.1)
CHLORIDE SERPL-SCNC: 105 MMOL/L (ref 97–108)
CO2 SERPL-SCNC: 27 MMOL/L (ref 21–32)
CREAT SERPL-MCNC: 1.16 MG/DL (ref 0.55–1.02)
CRP SERPL-MCNC: <0.29 MG/DL (ref 0–0.6)
DIFFERENTIAL METHOD BLD: NORMAL
ECHO AO ROOT DIAM: 2.84 CM
ECHO AV AREA PEAK VELOCITY: 3.33 CM2
ECHO AV AREA/BSA PEAK VELOCITY: 1.6 CM2/M2
ECHO AV PEAK GRADIENT: 9.59 MMHG
ECHO AV PEAK VELOCITY: 154.83 CM/S
ECHO LA AREA 4C: 17.24 CM2
ECHO LA MAJOR AXIS: 2.73 CM
ECHO LA MINOR AXIS: 1.35 CM
ECHO LA VOL 2C: 35.43 ML (ref 22–52)
ECHO LA VOL 4C: 50.51 ML (ref 22–52)
ECHO LA VOL BP: 44.02 ML (ref 22–52)
ECHO LA VOL/BSA BIPLANE: 21.79 ML/M2 (ref 16–28)
ECHO LA VOLUME INDEX A2C: 17.54 ML/M2 (ref 16–28)
ECHO LA VOLUME INDEX A4C: 25 ML/M2 (ref 16–28)
ECHO LV E' LATERAL VELOCITY: 6 CM/S
ECHO LV E' SEPTAL VELOCITY: 6.32 CM/S
ECHO LV INTERNAL DIMENSION DIASTOLIC: 3.65 CM (ref 3.9–5.3)
ECHO LV INTERNAL DIMENSION SYSTOLIC: 2.2 CM
ECHO LV IVSD: 1.14 CM (ref 0.6–0.9)
ECHO LV MASS 2D: 122.5 G (ref 67–162)
ECHO LV MASS INDEX 2D: 60.6 G/M2 (ref 43–95)
ECHO LV POSTERIOR WALL DIASTOLIC: 1.01 CM (ref 0.6–0.9)
ECHO LVOT DIAM: 2.42 CM
ECHO LVOT PEAK GRADIENT: 5 MMHG
ECHO LVOT PEAK VELOCITY: 111.77 CM/S
ECHO MV A VELOCITY: 102.53 CM/S
ECHO MV AREA PHT: 2.46 CM2
ECHO MV E DECELERATION TIME (DT): 308.65 MS
ECHO MV E VELOCITY: 75.04 CM/S
ECHO MV E/A RATIO: 0.73
ECHO MV E/E' LATERAL: 12.51
ECHO MV E/E' RATIO (AVERAGED): 12.19
ECHO MV E/E' SEPTAL: 11.87
ECHO MV PRESSURE HALF TIME (PHT): 89.51 MS
ECHO PV MAX VELOCITY: 108.44 CM/S
ECHO PV PEAK INSTANTANEOUS GRADIENT SYSTOLIC: 4.7 MMHG
ECHO RV INTERNAL DIMENSION: 4.01 CM
ECHO RV TAPSE: 2.09 CM (ref 1.5–2)
EOSINOPHIL # BLD: 0.3 K/UL (ref 0–0.4)
EOSINOPHIL NFR BLD: 6 % (ref 0–7)
ERYTHROCYTE [DISTWIDTH] IN BLOOD BY AUTOMATED COUNT: 12.5 % (ref 11.5–14.5)
GLUCOSE SERPL-MCNC: 113 MG/DL (ref 65–100)
HCT VFR BLD AUTO: 36 % (ref 35–47)
HGB BLD-MCNC: 12.3 G/DL (ref 11.5–16)
IMM GRANULOCYTES # BLD AUTO: 0 K/UL (ref 0–0.04)
IMM GRANULOCYTES NFR BLD AUTO: 0 % (ref 0–0.5)
LACTATE SERPL-SCNC: 1.6 MMOL/L (ref 0.4–2)
LYMPHOCYTES # BLD: 1.6 K/UL (ref 0.8–3.5)
LYMPHOCYTES NFR BLD: 32 % (ref 12–49)
MCH RBC QN AUTO: 31.7 PG (ref 26–34)
MCHC RBC AUTO-ENTMCNC: 34.2 G/DL (ref 30–36.5)
MCV RBC AUTO: 92.8 FL (ref 80–99)
MONOCYTES # BLD: 0.5 K/UL (ref 0–1)
MONOCYTES NFR BLD: 10 % (ref 5–13)
NEUTS SEG # BLD: 2.6 K/UL (ref 1.8–8)
NEUTS SEG NFR BLD: 51 % (ref 32–75)
NRBC # BLD: 0 K/UL (ref 0–0.01)
NRBC BLD-RTO: 0 PER 100 WBC
PLATELET # BLD AUTO: 195 K/UL (ref 150–400)
PMV BLD AUTO: 10.5 FL (ref 8.9–12.9)
POTASSIUM SERPL-SCNC: 4 MMOL/L (ref 3.5–5.1)
RBC # BLD AUTO: 3.88 M/UL (ref 3.8–5.2)
SODIUM SERPL-SCNC: 138 MMOL/L (ref 136–145)
THERAPEUTIC RANGE,PTTT: NORMAL SECS (ref 58–77)
WBC # BLD AUTO: 5.2 K/UL (ref 3.6–11)

## 2021-12-08 PROCEDURE — 74011250637 HC RX REV CODE- 250/637: Performed by: FAMILY MEDICINE

## 2021-12-08 PROCEDURE — 36415 COLL VENOUS BLD VENIPUNCTURE: CPT

## 2021-12-08 PROCEDURE — 80048 BASIC METABOLIC PNL TOTAL CA: CPT

## 2021-12-08 PROCEDURE — 93306 TTE W/DOPPLER COMPLETE: CPT

## 2021-12-08 PROCEDURE — 99232 SBSQ HOSP IP/OBS MODERATE 35: CPT | Performed by: STUDENT IN AN ORGANIZED HEALTH CARE EDUCATION/TRAINING PROGRAM

## 2021-12-08 PROCEDURE — 85730 THROMBOPLASTIN TIME PARTIAL: CPT

## 2021-12-08 PROCEDURE — 93306 TTE W/DOPPLER COMPLETE: CPT | Performed by: SPECIALIST

## 2021-12-08 PROCEDURE — 83605 ASSAY OF LACTIC ACID: CPT

## 2021-12-08 PROCEDURE — 85025 COMPLETE CBC W/AUTO DIFF WBC: CPT

## 2021-12-08 PROCEDURE — 86140 C-REACTIVE PROTEIN: CPT

## 2021-12-08 RX ADMIN — Medication 10 ML: at 06:00

## 2021-12-08 RX ADMIN — CARVEDILOL 25 MG: 12.5 TABLET, FILM COATED ORAL at 09:59

## 2021-12-08 RX ADMIN — CETIRIZINE HYDROCHLORIDE 10 MG: 10 TABLET, FILM COATED ORAL at 09:59

## 2021-12-08 RX ADMIN — ATORVASTATIN CALCIUM 40 MG: 40 TABLET, FILM COATED ORAL at 09:59

## 2021-12-08 RX ADMIN — LEVOTHYROXINE SODIUM 88 MCG: 88 TABLET ORAL at 07:30

## 2021-12-08 RX ADMIN — AMLODIPINE BESYLATE 5 MG: 5 TABLET ORAL at 09:59

## 2021-12-08 RX ADMIN — PANTOPRAZOLE SODIUM 40 MG: 40 TABLET, DELAYED RELEASE ORAL at 07:30

## 2021-12-08 RX ADMIN — ASPIRIN 81 MG CHEWABLE TABLET 81 MG: 81 TABLET CHEWABLE at 09:59

## 2021-12-08 NOTE — DISCHARGE SUMMARY
Discharge Summary       PATIENT ID: Sarai Comer  MRN: [id number]   YOB: 1945    DATE OF ADMISSION: 12/6/2021  6:22 PM    DATE OF DISCHARGE: 12/8/2021  PRIMARY CARE PROVIDER: Patricia Holder NP     DISCHARGING PROVIDER: Vijay Lawrence MD    To contact this individual call [phone] and ask the  to page. If unavailable ask to be transferred the Adult Hospitalist Department. CONSULTATIONS: IP CONSULT TO CARDIOLOGY    PROCEDURES/SURGERIES: * No surgery found *    ADMITTING DIAGNOSES & HOSPITAL COURSE:   Chest pain   COVID19 - incidental     Alanda Favre a 78 y. o. female with a pmhx CAD,  chronic pancreatitis, GERD, and  HTN who presents as a transfer from [facility] for [facility], and  NSTEMI. Nereida Gomez stated that she woke up this morning with midsternal chest tightness that was continuous until about 3:30 PM.  She denies fever, chills, nausea, vomiting, diaphoresis, cough, palpitations, or lower extremity edema. Carlitos Palacios has a past medical history of CAD status post cath in 2014 with reported lesions in the first diagonal, and RCA.  She reports that she did receive any intervention at that time. Carlitos Palacios does not remember the name of her physician. Pt admitted, troponin elevated and patient was started on heparin gtt. Troponin increased but relatively flat. Chest pain quickly resolved. Pt with exposure to COVID 4 weeks ago, and incidental finding of positive test on admit. Cards consulted, recommended echo which was normal. Recommend o/p stress test once out of quarantine.        DISCHARGE DIAGNOSES / PLAN:      Chest pain  Elevated troponin - Trop relatively flat   - s/p heparin briefly   - Cardiology consulted --> o/p stress test  - Echo within normal   - Continue home coreg  - Add ACE if EF depressed     COVID+ - incidental finding  - Pt had exposure over 4 weeks ago, asymtpomatic.   - Will need to quarantine 10 days until 12/17     HTN - continue home coreg, norvasc   Hypothyroid - home synthroid   GERD - PPI   Allergies - zyrtec     ADDITIONAL CARE RECOMMENDATIONS:   - Please take all medications as prescribed. Note changes as below. **no changes  - Please make sure to follow up with your primary care physician within 1-2 weeks of discharge for hospital follow up. You will need to get a stress test as an outpatient ordered by your primary care provider.  - Please make sure to continue to monitor for: Chest pain, shortness of breath, high fevers, and return to the Emergency Department with any of these symptoms.   - Please get up slowly from a seated or laying position, avoid falls. - Avoid tobacco, alcohol and other illicit drug use. - Diet restrictions: None  - Activity restrictions: None  - Wound care: None  - Quarantine for 10 days after your positive test (12/6), and your last 3 days have to be improving. PENDING TEST RESULTS:   At the time of discharge the following test results are still pending: None    FOLLOW UP APPOINTMENTS:    Follow-up Information     Follow up With Specialties Details Why Contact Info    Cesar Tran NP Nurse Practitioner In 1 week  [facility]  [address] [phone]               DIET: Resume previous diet    ACTIVITY: Activity as tolerated    WOUND CARE: None    EQUIPMENT needed: None      DISCHARGE MEDICATIONS:  Current Discharge Medication List      CONTINUE these medications which have NOT CHANGED    Details   lidocaine (Lidoderm) 5 % Apply patch to the affected area for 12 hours a day and remove for 12 hours a day. Qty: 5 Each, Refills: 0      levocetirizine (XYZAL) 5 mg tablet Take 1 Tablet by mouth daily. Qty: 90 Tablet, Refills: 3      levothyroxine (SYNTHROID) 88 mcg tablet Take 1 Tablet by mouth Daily (before breakfast).   Qty: 90 Tablet, Refills: 3    Associated Diagnoses: Hypothyroidism due to acquired atrophy of thyroid      irbesartan-hydroCHLOROthiazide (AVALIDE) 300-12.5 mg per tablet TAKE 1 TABLET EVERY DAY FOR PRESSURE and heart  Qty: 90 Tab, Refills: 3    Associated Diagnoses: Essential hypertension      pravastatin (PRAVACHOL) 40 mg tablet Take 1 Tab by mouth nightly. Indications: heart and cholesterol  Qty: 90 Tab, Refills: 3    Associated Diagnoses: Pure hypercholesterolemia      amLODIPine (NORVASC) 5 mg tablet TAKE 1 TABLET EVERY DAY for pressure and heart  Qty: 90 Tab, Refills: 3    Associated Diagnoses: Essential hypertension      omeprazole (PRILOSEC) 40 mg capsule TAKE 1 CAPSULE EVERY DAY for stomach  Qty: 90 Cap, Refills: 3    Associated Diagnoses: Epigastric pain; Gastroesophageal reflux disease without esophagitis      carvediloL (COREG) 25 mg tablet TAKE 1 TABLET TWICE DAILY WITH MEALS FOR PRESSURE AND HEART  Qty: 180 Tab, Refills: 3    Associated Diagnoses: Coronary artery disease involving native coronary artery of native heart without angina pectoris; Essential hypertension with goal blood pressure less than 140/90      betamethasone dipropionate (DIPROLENE) 0.05 % ointment Apply  to affected area two (2) times a day. Qty: 45 g, Refills: 1      cyanocobalamin, vitamin B-12, 5,000 mcg TbDi Take 1 Tab by mouth daily. aspirin 81 mg chewable tablet Take 1 Tab by mouth daily. Qty: 100 Tab, Refills: 3    Associated Diagnoses: Coronary artery disease involving native coronary artery without angina pectoris      nitroglycerin (NITROSTAT) 0.4 mg SL tablet 1 Tab by SubLINGual route every five (5) minutes as needed for Chest Pain (call 911 if not relieved by 3). Qty: 25 Tab, Refills: 1      cholecalciferol, VITAMIN D3, (VITAMIN D3) 5,000 unit tab tablet Take 1 Tab by mouth daily. Qty: 90 Tab, Refills: 3         STOP taking these medications       amoxicillin-clavulanate (Augmentin) 875-125 mg per tablet Comments:   Reason for Stopping:                 NOTIFY YOUR PHYSICIAN FOR ANY OF THE FOLLOWING:   Fever over 101 degrees for 24 hours.    Chest pain, shortness of breath, fever, chills, nausea, vomiting, diarrhea, change in mentation, falling, weakness, bleeding. Severe pain or pain not relieved by medications. Or, any other signs or symptoms that you may have questions about. DISPOSITION:   X Home With:   OT  PT  HH  RN       Long term SNF/Inpatient Rehab    Independent/assisted living    Hospice    Other:       PATIENT CONDITION AT DISCHARGE:     Functional status    Poor     Deconditioned    X Independent      Cognition   X  Lucid     Forgetful     Dementia      Catheters/lines (plus indication)    Leon     PICC     PEG    X None      Code status    X Full code     DNR      PHYSICAL EXAMINATION AT DISCHARGE:  Visit Vitals  BP (!) 153/85 (BP 1 Location: Left upper arm, BP Patient Position: At rest)   Pulse 66   Temp 98.7 °F (37.1 °C)   Resp 16   Ht 5' 7\" (1.702 m)   Wt 90.3 kg (199 lb)   SpO2 95%   BMI 31.17 kg/m²     Gen: NAD, awake in bed  HEENT: NC/AT, sclera anicteric, PERRL, EOMI  CV: RRR no m/r/g, normal S1 and S2, no pedal edema   Resp: CTA b/l no increased work of breathing, no wheezing or rhonchi, speaking in full sentences   Abd: NT/ND, normal bowel sounds, no rebound or guarding  Ext: 2+ pulses, no edema  Skin: No rashes or lesions        CHRONIC MEDICAL DIAGNOSES:  Problem List as of 12/8/2021 Date Reviewed: 7/8/2021          Codes Class Noted - Resolved    [facility] (non[facility]ST elevated [facility]) [facility] ICD-10-CM: I21.4  ICD-9-CM: 410.70  12/6/2021 - Present        Chest pain ICD-10-CM: R07.9  ICD-9-CM: 786.50  12/6/2021 - Present        Hypertensive urgency ICD-10-CM: I16.0  ICD-9-CM: 401.9  12/6/2021 - Present        Left hip pain ICD-10-CM: M25.552  ICD-9-CM: 719.45  11/4/2019 - Present        Rhus dermatitis ICD-10-CM: L25.5  ICD-9-CM: 692.6  9/25/2019 - Present        Acute pain of right shoulder ICD-10-CM: M25.511  ICD-9-CM: 719.41  6/16/2019 - Present        Sprain and strain ICD-10-CM: T14. 8XXA  ICD-9-CM: 848.9  4/28/2019 - Present Right non-suppurative otitis media ICD-10-CM: H65.91  ICD-9-CM: 381.4  4/28/2019 - Present        Acute non-recurrent maxillary sinusitis ICD-10-CM: J01.00  ICD-9-CM: 461.0  2/28/2019 - Present        Advance directive discussed with patient ICD-10-CM: Z71.89  ICD-9-CM: V65.49  5/18/2016 - Present    Overview Signed 5/18/2016  2:33 PM by Froilan Milton MD     Discussed 5/18/2016             Hyperlipemia ICD-10-CM: E78.5  ICD-9-CM: 272.4  11/28/2014 - Present        S/P cardiac cath ICD-10-CM: T40.598  ICD-9-CM: V45.89  3/4/2014 - Present    Overview Signed 3/4/2014  9:08 AM by Jayson Mckeon NP     3/3/14 1st diagonal: 60 %  proximal  -- Mid RCA: There was a 55 % stenosis.                    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             Hypothyroidism ICD-10-CM: E03.9  ICD-9-CM: 244.9  3/2/2014 - Present        GERD (gastroesophageal reflux disease) ICD-10-CM: K21.9  ICD-9-CM: 530.81  3/2/2014 - Present        Anxiety ICD-10-CM: F41.9  ICD-9-CM: 300.00  3/2/2014 - Present        Family history of ischemic heart disease ICD-10-CM: Z82.49  ICD-9-CM: V17.3  3/2/2014 - Present        Eczema ICD-10-CM: L30.9  ICD-9-CM: 692.9  12/24/2013 - Present        Acne rosacea ICD-10-CM: L71.9  ICD-9-CM: 695.3  12/24/2013 - Present        S/P cholecystectomy ICD-10-CM: Z90.49  ICD-9-CM: V45.79  12/24/2013 - Present        IBS (irritable bowel syndrome) ICD-10-CM: K58.9  ICD-9-CM: 564.1  12/24/2013 - Present        Migraine ICD-10-CM: F56.403  ICD-9-CM: 346.90  12/24/2013 - Present        HTN (hypertension) ICD-10-CM: I10  ICD-9-CM: 401.9  12/24/2013 - Present        RESOLVED: Unstable angina pectoris ([facility] ICD-10-CM: I20.0  ICD-9-CM: 411.1  3/2/2014 - 12/7/2017        RESOLVED: Malignant essential hypertension ICD-10-CM: I10  ICD-9-CM: 401.0  3/2/2014 - 11/28/2014        RESOLVED: [facility] pancreatitis [facility] ICD-10-CM: K86.1  ICD-9-CM: 972.2  12/24/2013 - 8/28/2018              Greater than 30 minutes were spent with the patient on counseling and coordination of care    Signed:   Sheree Sims MD  12/8/2021  12:50 PM

## 2021-12-08 NOTE — PROGRESS NOTES
Problem: Risk for Spread of Infection  Goal: Prevent transmission of infectious organism to others  Description: Prevent the transmission of infectious organisms to other patients, staff members, and visitors. Outcome: Resolved/Met     Problem: Patient Education:  Go to Education Activity  Goal: Patient/Family Education  Outcome: Resolved/Met     Problem: Airway Clearance - Ineffective  Goal: Achieve or maintain patent airway  Outcome: Resolved/Met     Problem: Gas Exchange - Impaired  Goal: Absence of hypoxia  Outcome: Resolved/Met  Goal: Promote optimal lung function  Outcome: Resolved/Met     Problem: Breathing Pattern - Ineffective  Goal: Ability to achieve and maintain a regular respiratory rate  Outcome: Resolved/Met     Problem:  Body Temperature -  Risk of, Imbalanced  Goal: Ability to maintain a body temperature within defined limits  Outcome: Resolved/Met  Goal: Will regain or maintain usual level of consciousness  Outcome: Resolved/Met  Goal: Complications related to the disease process, condition or treatment will be avoided or minimized  Outcome: Resolved/Met     Problem: Isolation Precautions - Risk of Spread of Infection  Goal: Prevent transmission of infectious organism to others  Outcome: Resolved/Met     Problem: Nutrition Deficits  Goal: Optimize nutrtional status  Outcome: Resolved/Met     Problem: Risk for Fluid Volume Deficit  Goal: Maintain normal heart rhythm  Outcome: Resolved/Met  Goal: Maintain absence of muscle cramping  Outcome: Resolved/Met  Goal: Maintain normal serum potassium, sodium, calcium, phosphorus, and pH  Outcome: Resolved/Met     Problem: Loneliness or Risk for Loneliness  Goal: Demonstrate positive use of time alone when socialization is not possible  Outcome: Resolved/Met     Problem: Fatigue  Goal: Verbalize increase energy and improved vitality  Outcome: Resolved/Met     Problem: Patient Education: Go to Patient Education Activity  Goal: Patient/Family Education  Outcome: Resolved/Met     Problem: Patient Education: Go to Patient Education Activity  Goal: Patient/Family Education  Outcome: Resolved/Met     Problem: Unstable angina/NSTEMI: Day of Admission/Day 1  Goal: Off Pathway (Use only if patient is Off Pathway)  Outcome: Resolved/Met  Goal: Activity/Safety  Outcome: Resolved/Met  Goal: Consults, if ordered  Outcome: Resolved/Met  Goal: Diagnostic Test/Procedures  Outcome: Resolved/Met  Goal: Nutrition/Diet  Outcome: Resolved/Met  Goal: Discharge Planning  Outcome: Resolved/Met  Goal: Medications  Outcome: Resolved/Met  Goal: Respiratory  Outcome: Resolved/Met  Goal: Treatments/Interventions/Procedures  Outcome: Resolved/Met  Goal: Psychosocial  Outcome: Resolved/Met  Goal: *Hemodynamically stable  Outcome: Resolved/Met  Goal: *Optimal pain control at patient's stated goal  Outcome: Resolved/Met  Goal: *Lungs clear or at baseline  Outcome: Resolved/Met     Problem: Unstable angina/NSTEMI: Day 2  Goal: Off Pathway (Use only if patient is Off Pathway)  Outcome: Resolved/Met  Goal: Activity/Safety  Outcome: Resolved/Met  Goal: Consults, if ordered  Outcome: Resolved/Met  Goal: Diagnostic Test/Procedures  Outcome: Resolved/Met  Goal: Nutrition/Diet  Outcome: Resolved/Met  Goal: Discharge Planning  Outcome: Resolved/Met  Goal: Medications  Outcome: Resolved/Met  Goal: Respiratory  Outcome: Resolved/Met  Goal: Treatments/Interventions/Procedures  Outcome: Resolved/Met  Goal: Psychosocial  Outcome: Resolved/Met  Goal: *Hemodynamically stable  Outcome: Resolved/Met  Goal: *Optimal pain control at patient's stated goal  Outcome: Resolved/Met  Goal: *Lungs clear or at baseline  Outcome: Resolved/Met     Problem: Unstable Angina/NSTEMI: Discharge Outcomes  Goal: *Hemodynamically stable  Outcome: Resolved/Met  Goal: *Stable cardiac rhythm  Outcome: Resolved/Met  Goal: *Lungs clear or at baseline  Outcome: Resolved/Met  Goal: *Optimal pain control at patient's stated goal  Outcome: Resolved/Met  Goal: *Identifies cardiac risk factors  Outcome: Resolved/Met  Goal: *Verbalizes home exercise program, activity guidelines, cardiac precautions  Outcome: Resolved/Met  Goal: *Verbalizes understanding and describes prescribed diet  Outcome: Resolved/Met  Goal: *Verbalizes name, dosage, time, side effects, and number of days to continue medications  Outcome: Resolved/Met  Goal: *Anxiety reduced or absent  Outcome: Resolved/Met  Goal: *Understands and describes signs and symptoms to report to providers(Stroke Metric)  Outcome: Resolved/Met  Goal: *Describes follow-up/return visits to physicians  Outcome: Resolved/Met  Goal: *Describes available resources and support systems  Outcome: Resolved/Met  Goal: *Influenza immunization  Outcome: Resolved/Met  Goal: *Pneumococcal immunization  Outcome: Resolved/Met  Goal: *Describes smoking cessation resources  Outcome: Resolved/Met     Problem: Discharge Planning  Goal: *Discharge to safe environment  Outcome: Resolved/Met  Goal: *Knowledge of medication management  Outcome: Resolved/Met  Goal: *Knowledge of discharge instructions  Outcome: Resolved/Met     Problem: Patient Education: Go to Patient Education Activity  Goal: Patient/Family Education  Outcome: Resolved/Met

## 2021-12-08 NOTE — PROGRESS NOTES
Transition of Care Plan   RUR- 9% Moderate Risk   DISPOSITION: The disposition plan is home with family assistance.  F/U with PCP/Specialist     Transport: [facility]/family     It was reported during this Morning's IDR's that patient will discharge home today. At this time patient does not have any CM needs. Medicare pt has received, reviewed, and signed 2nd IM letter informing them of their right to appeal the discharge. Signed copy has been placed on pt bedside chart.     VINICIUS Monet, CRM, LMHP-e

## 2021-12-08 NOTE — PROGRESS NOTES
Shelly Taylor DO  [facility] [address], [address] [facility], [address] [place] [address]                                       Office [phone],[facility] [phone]    2021     Admit Date: 2021    NAME: Levar Velázquez   :  1945     MRN: [id number]     Assessment/Plan:    COVID-19 positive-patient likely noninfectious. Subacute period. Exposure approximately 4 weeks ago     Chest pain, elevated troponinpresentation not consistent with acute coronary syndrome. Echo with normal LV function. Recommend d/c with current medical therapy. Advised patient she should have stress test with her Esteban Pinto NP in [facility] after she recovers from [facility]. .                   Please do not hesitate to contact us with questions or concerns. See note below for details. Aleena Singleton DO      Interval Hx:  No chest pain.      ROS:  Constitutional: neg  Cardiovascular: neg  Respiratory: neg      Medications:  Current Facility-Administered Medications   Medication Dose Route Frequency    atorvastatin (LIPITOR) tablet 40 mg  40 mg Oral DAILY    nitroglycerin (NITROSTAT) tablet 0.4 mg  0.4 mg SubLINGual PRN    pantoprazole (PROTONIX) tablet 40 mg  40 mg Oral ACB    levothyroxine (SYNTHROID) tablet 88 mcg  88 mcg Oral ACB    cetirizine (ZYRTEC) tablet 10 mg  10 mg Oral DAILY    carvediloL (COREG) tablet 25 mg  25 mg Oral BID WITH MEALS    aspirin chewable tablet 81 mg  81 mg Oral DAILY    amLODIPine (NORVASC) tablet 5 mg  5 mg Oral DAILY    sodium chloride (NS) flush 5-40 mL  5-40 mL IntraVENous Q8H    sodium chloride (NS) flush 5-40 mL  5-40 mL IntraVENous PRN    acetaminophen (TYLENOL) tablet 650 mg  650 mg Oral Q6H PRN    Or    acetaminophen (TYLENOL) suppository 650 mg  650 mg Rectal Q6H PRN    polyethylene glycol (MIRALAX) packet 17 g  17 g Oral DAILY PRN    ondansetron (ZOFRAN ODT) tablet 4 mg  4 mg Oral Q8H PRN    Or    ondansetron (ZOFRAN) injection 4 mg  4 mg IntraVENous Q6H PRN       Physical Exam:  Visit Vitals  BP (!) 153/85 (BP 1 Location: Left upper arm, BP Patient Position: At rest)   Pulse 66   Temp 98.7 °F (37.1 °C)   Resp 16   Ht 5' 7\" (1.702 m)   Wt 199 lb (90.3 kg)   SpO2 95%   BMI 31.17 kg/m²      O2 Device: None (Room air)        Gen: Well-developed, well-nourished, no acute distress  Resp: No accessory muscle use, Clear breath sounds, No rales or rhonchi  Card: Normal Rate,Regular Rythm, Normal S1, S2, No murmurs, rubs or gallop. No edema. PT/DP pulses 2+. Psych:  Good insight, oriented to person, place       Labs:    No results for input(s): CPK, TROIQ in the last 72 hours. No lab exists for component: CKQMB, CPKMB  Recent Labs     12/08/21  0559      K 4.0      BUN 14   CREA 1.16*   *   CA 9.3     Recent Labs     12/08/21  0559   WBC 5.2   HGB 12.3   HCT 36.0        No results for input(s): CHOL, LDLC in the last 72 hours.     No lab exists for component: TGL, HDLC,  HBA1C

## 2021-12-08 NOTE — DISCHARGE INSTRUCTIONS
Dear Sarai Comer,    Thank you for choosing [address] [place] [address] for your healthcare needs. We strive to provide EXCELLENT care to you and your family. In an effort to explain clearly why you were here in the hospital, I've also written a very brief summary below. Other details including formal diagnosis, medication changes, and follow up appointment recommendations can also be found in this packet. You were admitted for chest pain due to myocarditis vs. Coronary disease for which you were started on heparin and then transitioned off once we saw your flat troponin trend. You also received care from specialist physicians in the following specialties:  [address]    Here are the updates to your medication list:  **no changes    Remember that it is important for you to take your medications exactly as they are prescribed. It is helpful to keep a list of your medication with the names, dosages, and times to be taken in your wallet. Additionally,     - Please make sure to follow up with your primary care physician within 1-2 weeks of discharge for hospital follow up. You will need to get a stress test as an outpatient ordered by your primary care provider.  - Please make sure to continue to monitor for: Chest pain, shortness of breath, high fevers, and return to the Emergency Department with any of these symptoms.   - Please get up slowly from a seated or laying position, avoid falls. - Avoid tobacco, alcohol and other illicit drug use. - Diet restrictions: None  - Activity restrictions: None  - Wound care: None  - Quarantine for 10 days after your positive test (12/6), and your last 3 days have to be improving. Make sure to also see your primary care doctor for follow-up. Bring these papers with you and be sure to review your medication list with your doctor. I cannot stress the importance of follow up enough.  I've included the information for your follow-up appointments below: Follow-up Information     Follow up With Specialties Details Why Contact Info    Tereza Cespedes NP Nurse Practitioner In 1 week  [facility]  [address]  [phone]              Should you have any fever over 101 degrees for 24 hours, chest pain, shortness of breath, fever, chills, nausea, vomiting, diarrhea, change in mentation, falling, weakness, bleeding, or worsening pain, please seek medical attention immediately. Finally, as your discharging physician, you may be receiving a survey regarding my care. I would greatly value and appreciate your input in the survey as we strive for excellence. If you have any questions, I can be reached at [phone].   Thank you so much again for allowing me to care for you at [facility].    Respectfully yours,  Brent Cabrales MD

## 2021-12-09 ENCOUNTER — PATIENT OUTREACH (OUTPATIENT)
Dept: CASE MANAGEMENT | Age: 76
End: 2021-12-09

## 2021-12-09 ENCOUNTER — TELEPHONE (OUTPATIENT)
Dept: FAMILY MEDICINE CLINIC | Age: 76
End: 2021-12-09

## 2021-12-09 LAB
ATRIAL RATE: 64 BPM
CALCULATED P AXIS, ECG09: 54 DEGREES
CALCULATED R AXIS, ECG10: 54 DEGREES
CALCULATED T AXIS, ECG11: 63 DEGREES
DIAGNOSIS, 93000: NORMAL
P-R INTERVAL, ECG05: 172 MS
Q-T INTERVAL, ECG07: 404 MS
QRS DURATION, ECG06: 80 MS
QTC CALCULATION (BEZET), ECG08: 416 MS
VENTRICULAR RATE, ECG03: 64 BPM

## 2021-12-09 RX ORDER — HYDROXYZINE 25 MG/1
25 TABLET, FILM COATED ORAL
Qty: 30 TABLET | Refills: 0 | Status: SHIPPED | OUTPATIENT
Start: 2021-12-09 | End: 2021-12-19

## 2021-12-09 NOTE — PROGRESS NOTES
Care Transitions Initial Call    Call within 2 business days of discharge: Yes     Patient: Donna Aguilera Patient : 1945 MRN: [id number]    Last Discharge [address]       Complaint Diagnosis Description Type Department Provider    21 Abnormal Lab Results NSTEMI (non-ST elevated myocardial infarction) ([facility] [address] . .. ED to Hosp-Admission (Discharged) (ADMIT) José Manuel Bangura MD; Mac. ..    21 Chest Pain Chest pain, unspecified type ED (TRANSFER) Hafsa Donovan MD          Was this an external facility discharge? No     Challenges to be reviewed by the provider   Additional needs identified to be addressed with provider: no  none         Method of communication with provider : none    Discussed COVID-19 related testing which was available at this time. Test results were positive. Patient informed of results, if available? yes     Advance Care Planning:   Does patient have an Advance Directive:  yes; reviewed and current     Inpatient Readmission Risk score: Unplanned Readmit Risk Score: 9.1 ( )    Was this a readmission? no   Patient stated reason for the admission: chest pain    Patients top risk factors for readmission: medical condition-anxiety, chest pain, nstemi   Interventions to address risk factors: Scheduled appointment with PCP-21 and Education of patient/family/caregiver/guardian to support self-management-drink 3 to 4 bottles of water a day, check bp once a day, take hydroxyzine as needed for anxiety    Care Transition Nurse (CTN) contacted the patient by telephone to perform post hospital discharge assessment. Verified name and  with patient as identifiers. Provided introduction to self, and explanation of the CTN role. CTN reviewed discharge instructions, medical action plan and red flags with patient who verbalized understanding. Were discharge instructions available to patient? yes.  Reviewed appropriate site of care based on symptoms and resources available to patient including: PCP and When to call 911. Patient given an opportunity to ask questions and does not have any further questions or concerns at this time. The patient agrees to contact the PCP office for questions related to their healthcare. Medication reconciliation was performed with patient, who verbalizes understanding of administration of home medications. Advised obtaining a 90-day supply of all daily and as-needed medications. Referral to Pharm D needed: no     Home Health/Outpatient orders at discharge: none      Durable Medical Equipment ordered at discharge: None      Covid Risk Education    Educated patient about risk for severe COVID-19 due to risk factors according to CDC guidelines. CTN reviewed discharge instructions, medical action plan and red flag symptoms with the patient who verbalized understanding. Discussed COVID vaccination status: no. Education provided on COVID-19 vaccination as appropriate. Discussed exposure protocols and quarantine with CDC Guidelines. Patient was given an opportunity to verbalize any questions and concerns and agrees to contact CTN or health care provider for questions related to their healthcare. Was patient discharged with a pulse oximeter? no      Discussed follow-up appointments. If no appointment was previously scheduled, appointment scheduling offered: no. Is follow up appointment scheduled within 7 days of discharge? no. Patient will be seen on 12/21 due to quarantine   12[address] Yony Sage follow up appointment(s):   Future Appointments   Date Time Provider Kathy Hare   12/21/2021 10:10 AM Hitesh Mora NP [facility]     Non-[facility] follow up appointment(s): none at this time. Plan for follow-up call in 5-7 days based on severity of symptoms and risk factors. Plan for next call: self management-anxiety, taking meds as prescribed, bp readings  CTN provided contact information for future needs.     Goals Addressed This Visit's Progress     Prevent complications post hospitalization. 12/9/21     Patient to check bp daily and notify ctn or pcp if over 140/90   Patient to take hydroxyzine as needed and no more than 3x per day for anxiety.  Patient to drink 3 to 4 tall glasses of water daily to ensure adequate hydration.  Patient to attend pcp appt on 12/21/21   Patient to watch for swelling in ankles and notify pcp if any is present    Ctn to follow up in one week.    0074 Suze Alvarado RN

## 2021-12-09 NOTE — TELEPHONE ENCOUNTER
Advised pt that I had spoken to daughter this morning and she was to re schedule OV. Pt stated she tried to call in and re schedule but could not get through.

## 2021-12-09 NOTE — TELEPHONE ENCOUNTER
----- Message from Chintan Costello sent at 12/9/2021  9:36 AM EST -----  Subject: Message to Provider    QUESTIONS  Information for Provider? patient is calling in wanting to know if she   needs to reschedule her 12/14 appt@ 1130am, due to her going to ED on   12/06 for chest pains, and still testing positive for COVID, and was   advised to quarantine for 10 days(which would be 1/16//21)patient would   like to be contacted or appt rescheduled to be able to come in person and   be seen. ---------------------------------------------------------------------------  --------------  Venessa HERNÁNDEZ  What is the best way for the office to contact you? OK to leave message on   voicemail  Preferred Call Back Phone Number? [phone]  ---------------------------------------------------------------------------  --------------  SCRIPT ANSWERS  Relationship to Patient?  Self

## 2021-12-09 NOTE — TELEPHONE ENCOUNTER
Please advise , daughter Sheri Angulo called pt was d/c for hospt yesterday , she also covid positive .  mom is very anxious and worried and daughter thinks she needs some anxiety meds

## 2021-12-09 NOTE — TELEPHONE ENCOUNTER
I sent in an as needed medication called hydroxyzine. Take this up to three times a day for anxiety. It may cause sedation. We also need to move back her appt with me until 12/17 or later as she is supposed to be quarantined d/t + COVID until 12/17 per hospital notes or switch her to virtual. I'd prefer to see her in person but that's up to her.

## 2021-12-10 NOTE — TELEPHONE ENCOUNTER
Daughter called and states that her mother has finished taking her prescription for sinus infection but she isn't feeling any better. Please advise.  Call back number [phone]

## 2021-12-10 NOTE — TELEPHONE ENCOUNTER
Called and spoke to daughter. Advised that covid is a viral illness and cannot be treated with ABX. Per Reynold Emerson she can try OTC nasal spray, allergy medication, neti pot, steam. Call office if worse or develop fevers. No

## 2021-12-23 ENCOUNTER — PATIENT OUTREACH (OUTPATIENT)
Dept: CASE MANAGEMENT | Age: 76
End: 2021-12-23

## 2021-12-23 NOTE — PROGRESS NOTES
Care Transitions Follow Up Call    Challenges to be reviewed by the provider   Additional needs identified to be addressed with provider: no  none           Method of communication with provider : none    Care Transition Nurse (CTN) contacted the patient by telephone to follow up after admission on 21. Verified name and  with patient as identifiers. Addressed changes since last contact: anxiety and sadness is better and patient recieved first covid shot   Follow up appointment completed? yes. Was follow up appointment scheduled within 7 days of discharge? no.       CTN reviewed discharge instructions, medical action plan and red flags with patient and discussed any barriers to care and/or understanding of plan of care after discharge. Discussed appropriate site of care based on symptoms and resources available to patient including: PCP and When to call 911. The patient agrees to contact the PCP office for questions related to their healthcare. Patients top risk factors for readmission: medical condition-nstemi, cad, htn, anxiety   Interventions to address risk factors: Scheduled appointment with PCP-21 and Education of patient/family/caregiver/guardian to support self-management-see goals. [facility] follow up appointment(s): No future appointments. Non-[facility] follow up appointment(s): none at this time. CTN provided contact information for future needs. Plan for follow-up call in 5-7 days based on severity of symptoms and risk factors. Plan for next call: medication management-ask if patient is checking bp and hr.      Goals Addressed                 This Visit's Progress     Prevent complications post hospitalization. 21     Patient to check bp daily and notify ctn or pcp if over 140/90   Patient to take hydroxyzine as needed and no more than 3x per day for anxiety.  Patient to drink 3 to 4 tall glasses of water daily to ensure adequate hydration.     Patient to attend pcp appt on 12/21/21   Patient to watch for swelling in ankles and notify pcp if any is present    Ctn to follow up in one week. Brodie Alvarado RN    12/23/21     Patient in much better spirits and received 1st covid shot today.  Patient to take hydroxyzine as needed and no more than 3x per day for anxiety.  Patient to drink 3 to 4 tall glasses of water daily to ensure adequate hydration.  Patient to take tylenol as needed for body aches or malaise from covid shot if she experiences.  CTN to follow up in one week.    Brodie Parr

## 2022-01-13 ENCOUNTER — PATIENT OUTREACH (OUTPATIENT)
Dept: CASE MANAGEMENT | Age: 77
End: 2022-01-13

## 2022-01-13 NOTE — PROGRESS NOTES
Patient has graduated from the Transitions of Care Coordination  program on 1/13/22. Patient/family has the ability to self-manage at this time Care management goals have been completed. Patient was not referred to the [facility] team for further management. Goals Addressed                 This Visit's Progress     COMPLETED: Prevent complications post hospitalization. On track     12/9/21     Patient to check bp daily and notify ctn or pcp if over 140/90   Patient to take hydroxyzine as needed and no more than 3x per day for anxiety.  Patient to drink 3 to 4 tall glasses of water daily to ensure adequate hydration.  Patient to attend pcp appt on 12/21/21   Patient to watch for swelling in ankles and notify pcp if any is present    Ctn to follow up in one week. Brodie Alvarado RN    12/23/21     Patient in much better spirits and received 1st covid shot today.  Patient to take hydroxyzine as needed and no more than 3x per day for anxiety.  Patient to drink 3 to 4 tall glasses of water daily to ensure adequate hydration.  Patient to take tylenol as needed for body aches or malaise from covid shot if she experiences.  CTN to follow up in one week.  Danica Yang RN    1/13/22   Patient has had no hospital admissions or er visits in 30 days. Brodie Alvarado RN                 Patient has Care Transition Nurse's contact information for any further questions, concerns, or needs. Patients upcoming visits:  No future appointments.

## 2022-03-18 PROBLEM — I21.4 NSTEMI (NON-ST ELEVATED MYOCARDIAL INFARCTION) (HCC): Status: ACTIVE | Noted: 2021-12-06

## 2022-03-18 PROBLEM — R07.9 CHEST PAIN: Status: ACTIVE | Noted: 2021-12-06

## 2022-03-18 PROBLEM — I16.0 HYPERTENSIVE URGENCY: Status: ACTIVE | Noted: 2021-12-06

## 2022-03-19 PROBLEM — J01.00 ACUTE NON-RECURRENT MAXILLARY SINUSITIS: Status: ACTIVE | Noted: 2019-02-28

## 2022-03-19 PROBLEM — H65.91 RIGHT NON-SUPPURATIVE OTITIS MEDIA: Status: ACTIVE | Noted: 2019-04-28

## 2022-03-19 PROBLEM — T14.8XXA SPRAIN AND STRAIN: Status: ACTIVE | Noted: 2019-04-28

## 2022-03-19 PROBLEM — M25.511 ACUTE PAIN OF RIGHT SHOULDER: Status: ACTIVE | Noted: 2019-06-16

## 2022-03-19 PROBLEM — L25.5 RHUS DERMATITIS: Status: ACTIVE | Noted: 2019-09-25

## 2022-03-20 PROBLEM — M25.552 LEFT HIP PAIN: Status: ACTIVE | Noted: 2019-11-04

## 2022-07-19 ENCOUNTER — TRANSCRIBE ORDER (OUTPATIENT)
Dept: REGISTRATION | Age: 77
End: 2022-07-19

## 2022-07-19 ENCOUNTER — HOSPITAL ENCOUNTER (OUTPATIENT)
Dept: MRI IMAGING | Age: 77
Discharge: HOME | End: 2022-07-19
Attending: INTERNAL MEDICINE
Payer: MEDICARE

## 2022-07-19 ENCOUNTER — HOSPITAL ENCOUNTER (OUTPATIENT)
Dept: GENERAL RADIOLOGY | Age: 77
Discharge: HOME | End: 2022-07-19
Payer: MEDICARE

## 2022-07-19 DIAGNOSIS — M12.812 ROTATOR CUFF ARTHROPATHY OF LEFT SHOULDER: Primary | ICD-10-CM

## 2022-07-19 DIAGNOSIS — M12.812 ROTATOR CUFF ARTHROPATHY OF LEFT SHOULDER: ICD-10-CM

## 2022-07-19 PROCEDURE — 73221 MRI JOINT UPR EXTREM W/O DYE: CPT

## 2022-07-19 PROCEDURE — 73030 X-RAY EXAM OF SHOULDER: CPT

## 2023-05-26 RX ORDER — LEVOCETIRIZINE DIHYDROCHLORIDE 5 MG/1
5 TABLET, FILM COATED ORAL DAILY
COMMUNITY
Start: 2021-07-08

## 2023-05-26 RX ORDER — ASPIRIN 81 MG/1
81 TABLET, CHEWABLE ORAL DAILY
COMMUNITY
Start: 2018-12-13

## 2023-05-26 RX ORDER — LIDOCAINE 50 MG/G
PATCH TOPICAL
COMMUNITY
Start: 2021-07-10

## 2023-05-26 RX ORDER — CARVEDILOL 25 MG/1
TABLET ORAL
COMMUNITY
Start: 2021-02-08

## 2023-05-26 RX ORDER — NITROGLYCERIN 0.4 MG/1
0.4 TABLET SUBLINGUAL
COMMUNITY
Start: 2018-06-15

## 2023-05-26 RX ORDER — OMEPRAZOLE 40 MG/1
CAPSULE, DELAYED RELEASE ORAL
COMMUNITY
Start: 2021-02-08

## 2023-05-26 RX ORDER — AMLODIPINE BESYLATE 5 MG/1
TABLET ORAL
COMMUNITY
Start: 2021-02-08

## 2023-05-26 RX ORDER — BETAMETHASONE DIPROPIONATE 0.05 %
OINTMENT (GRAM) TOPICAL 2 TIMES DAILY
COMMUNITY
Start: 2020-11-10

## 2023-05-26 RX ORDER — LEVOTHYROXINE SODIUM 88 UG/1
88 TABLET ORAL
COMMUNITY
Start: 2021-07-08

## 2023-05-26 RX ORDER — IRBESARTAN AND HYDROCHLOROTHIAZIDE 300; 12.5 MG/1; MG/1
TABLET, FILM COATED ORAL
COMMUNITY
Start: 2021-02-08

## 2023-05-26 RX ORDER — PRAVASTATIN SODIUM 40 MG
40 TABLET ORAL
COMMUNITY
Start: 2021-02-08

## 2023-07-17 ENCOUNTER — HOSPITAL ENCOUNTER (OUTPATIENT)
Facility: HOSPITAL | Age: 78
Discharge: HOME | End: 2023-07-20
Payer: COMMERCIAL

## 2023-07-17 DIAGNOSIS — M54.2 PAIN IN THE NECK: ICD-10-CM

## 2023-07-17 DIAGNOSIS — G89.29 OTHER CHRONIC PAIN: ICD-10-CM

## 2023-07-17 DIAGNOSIS — M25.551 HIP PAIN, RIGHT: ICD-10-CM

## 2023-07-17 PROCEDURE — 72050 X-RAY EXAM NECK SPINE 4/5VWS: CPT

## 2023-07-17 PROCEDURE — 73502 X-RAY EXAM HIP UNI 2-3 VIEWS: CPT

## 2023-08-29 ENCOUNTER — HOSPITAL ENCOUNTER (OUTPATIENT)
Facility: HOSPITAL | Age: 78
Discharge: HOME | End: 2023-09-01
Payer: COMMERCIAL

## 2023-08-29 DIAGNOSIS — Z78.0 ASYMPTOMATIC MENOPAUSE: ICD-10-CM

## 2023-08-29 PROCEDURE — 77080 DXA BONE DENSITY AXIAL: CPT
